# Patient Record
Sex: FEMALE | Race: BLACK OR AFRICAN AMERICAN | NOT HISPANIC OR LATINO | Employment: PART TIME | URBAN - METROPOLITAN AREA
[De-identification: names, ages, dates, MRNs, and addresses within clinical notes are randomized per-mention and may not be internally consistent; named-entity substitution may affect disease eponyms.]

---

## 2019-09-12 ENCOUNTER — APPOINTMENT (OUTPATIENT)
Dept: GENERAL RADIOLOGY | Facility: HOSPITAL | Age: 24
End: 2019-09-12

## 2019-09-12 ENCOUNTER — HOSPITAL ENCOUNTER (EMERGENCY)
Facility: HOSPITAL | Age: 24
Discharge: HOME | End: 2019-09-12
Attending: EMERGENCY MEDICINE | Admitting: EMERGENCY MEDICINE

## 2019-09-12 VITALS
DIASTOLIC BLOOD PRESSURE: 68 MMHG | RESPIRATION RATE: 12 BRPM | HEART RATE: 67 BPM | TEMPERATURE: 99.2 F | OXYGEN SATURATION: 98 % | WEIGHT: 120 LBS | SYSTOLIC BLOOD PRESSURE: 106 MMHG | HEIGHT: 61 IN | BODY MASS INDEX: 22.66 KG/M2

## 2019-09-12 DIAGNOSIS — R07.89 ATYPICAL CHEST PAIN: ICD-10-CM

## 2019-09-12 DIAGNOSIS — R00.2 PALPITATIONS: Primary | ICD-10-CM

## 2019-09-12 LAB
ALBUMIN SERPL-MCNC: 4.9 G/DL (ref 3.5–5.2)
ALBUMIN/GLOB SERPL: 1.6 G/DL
ALP SERPL-CCNC: 65 U/L (ref 39–117)
ALT SERPL W P-5'-P-CCNC: 18 U/L (ref 1–33)
ANION GAP SERPL CALCULATED.3IONS-SCNC: 13 MMOL/L (ref 5–15)
AST SERPL-CCNC: 14 U/L (ref 1–32)
BASOPHILS # BLD AUTO: 0.06 10*3/MM3 (ref 0–0.2)
BASOPHILS NFR BLD AUTO: 0.5 % (ref 0–1.5)
BILIRUB SERPL-MCNC: <0.2 MG/DL (ref 0.2–1.2)
BUN BLD-MCNC: 10 MG/DL (ref 6–20)
BUN/CREAT SERPL: 12.7 (ref 7–25)
CALCIUM SPEC-SCNC: 9.6 MG/DL (ref 8.6–10.5)
CHLORIDE SERPL-SCNC: 103 MMOL/L (ref 98–107)
CO2 SERPL-SCNC: 24 MMOL/L (ref 22–29)
CREAT BLD-MCNC: 0.79 MG/DL (ref 0.57–1)
D DIMER PPP FEU-MCNC: 0.45 MCGFEU/ML (ref 0–0.56)
DEPRECATED RDW RBC AUTO: 45.2 FL (ref 37–54)
EOSINOPHIL # BLD AUTO: 0.27 10*3/MM3 (ref 0–0.4)
EOSINOPHIL NFR BLD AUTO: 2.2 % (ref 0.3–6.2)
ERYTHROCYTE [DISTWIDTH] IN BLOOD BY AUTOMATED COUNT: 14 % (ref 12.3–15.4)
GFR SERPL CREATININE-BSD FRML MDRD: 91 ML/MIN/1.73
GLOBULIN UR ELPH-MCNC: 3 GM/DL
GLUCOSE BLD-MCNC: 84 MG/DL (ref 65–99)
HCT VFR BLD AUTO: 42 % (ref 34–46.6)
HGB BLD-MCNC: 13 G/DL (ref 12–15.9)
IMM GRANULOCYTES # BLD AUTO: 0.04 10*3/MM3 (ref 0–0.05)
IMM GRANULOCYTES NFR BLD AUTO: 0.3 % (ref 0–0.5)
LYMPHOCYTES # BLD AUTO: 2.69 10*3/MM3 (ref 0.7–3.1)
LYMPHOCYTES NFR BLD AUTO: 21.7 % (ref 19.6–45.3)
MCH RBC QN AUTO: 27.3 PG (ref 26.6–33)
MCHC RBC AUTO-ENTMCNC: 31 G/DL (ref 31.5–35.7)
MCV RBC AUTO: 88.2 FL (ref 79–97)
MONOCYTES # BLD AUTO: 0.75 10*3/MM3 (ref 0.1–0.9)
MONOCYTES NFR BLD AUTO: 6.1 % (ref 5–12)
NEUTROPHILS # BLD AUTO: 8.56 10*3/MM3 (ref 1.7–7)
NEUTROPHILS NFR BLD AUTO: 69.2 % (ref 42.7–76)
NRBC BLD AUTO-RTO: 0 /100 WBC (ref 0–0.2)
NT-PROBNP SERPL-MCNC: 27.5 PG/ML (ref 5–450)
PLATELET # BLD AUTO: 381 10*3/MM3 (ref 140–450)
PMV BLD AUTO: 10 FL (ref 6–12)
POTASSIUM BLD-SCNC: 3.7 MMOL/L (ref 3.5–5.2)
PROT SERPL-MCNC: 7.9 G/DL (ref 6–8.5)
RBC # BLD AUTO: 4.76 10*6/MM3 (ref 3.77–5.28)
SODIUM BLD-SCNC: 140 MMOL/L (ref 136–145)
TROPONIN T SERPL-MCNC: <0.01 NG/ML (ref 0–0.03)
TSH SERPL DL<=0.05 MIU/L-ACNC: 1.16 UIU/ML (ref 0.27–4.2)
WBC NRBC COR # BLD: 12.37 10*3/MM3 (ref 3.4–10.8)

## 2019-09-12 PROCEDURE — 84484 ASSAY OF TROPONIN QUANT: CPT | Performed by: PHYSICIAN ASSISTANT

## 2019-09-12 PROCEDURE — 93005 ELECTROCARDIOGRAM TRACING: CPT | Performed by: EMERGENCY MEDICINE

## 2019-09-12 PROCEDURE — 85025 COMPLETE CBC W/AUTO DIFF WBC: CPT | Performed by: PHYSICIAN ASSISTANT

## 2019-09-12 PROCEDURE — 84443 ASSAY THYROID STIM HORMONE: CPT | Performed by: PHYSICIAN ASSISTANT

## 2019-09-12 PROCEDURE — 80053 COMPREHEN METABOLIC PANEL: CPT | Performed by: PHYSICIAN ASSISTANT

## 2019-09-12 PROCEDURE — 71045 X-RAY EXAM CHEST 1 VIEW: CPT

## 2019-09-12 PROCEDURE — 83880 ASSAY OF NATRIURETIC PEPTIDE: CPT | Performed by: PHYSICIAN ASSISTANT

## 2019-09-12 PROCEDURE — 85379 FIBRIN DEGRADATION QUANT: CPT | Performed by: PHYSICIAN ASSISTANT

## 2019-09-12 PROCEDURE — 99284 EMERGENCY DEPT VISIT MOD MDM: CPT

## 2019-09-12 RX ORDER — SODIUM CHLORIDE 0.9 % (FLUSH) 0.9 %
10 SYRINGE (ML) INJECTION AS NEEDED
Status: DISCONTINUED | OUTPATIENT
Start: 2019-09-12 | End: 2019-09-12 | Stop reason: HOSPADM

## 2019-09-12 RX ORDER — ASPIRIN 325 MG
325 TABLET, DELAYED RELEASE (ENTERIC COATED) ORAL ONCE
Status: COMPLETED | OUTPATIENT
Start: 2019-09-12 | End: 2019-09-12

## 2019-09-12 RX ORDER — TRAZODONE HYDROCHLORIDE 50 MG/1
50 TABLET ORAL NIGHTLY
COMMUNITY
End: 2021-09-16 | Stop reason: SDUPTHER

## 2019-09-12 RX ADMIN — ASPIRIN 325 MG: 325 TABLET, COATED ORAL at 18:50

## 2019-09-12 NOTE — ED PROVIDER NOTES
"Carmelo Bravo is a 24 y.o. female with a history of atrial fibrillation who presents to the ED with complaints of intermittent palpitations since last night. She states that she has had 5 episodes of heart palpitations today, the last one being \"really bad\" and prompting her to come into the ED today. She also complains of shortness of breath, cough, tingling in her left upper extremity and fatigue, as well as, chest pain secondary to her palpitations. She describes the chest pain as sharp and relates it last about 3-5 seconds. She denies any abdominal pain, nausea, vomiting, or leg swelling. Of note, she has not been compliant with her medications. She did not take her bisoprolol for 3 months until she started having palpitations last night. She relates that she is on birth control and has a history of hypertension. She denies any prior history of DVT, PE, diabetes mellitus, or myocardial infarction. She denies any recent long travel or immobilization. Her cardiologist, who she sees for a fib and arrhythmia, is at [facility] and she advises that she has missed her last cardiologist appointment. Her last stress test was a while ago. She reports that she smokes about a pack every 2 days and she drinks alcohol. She denies any drug use. There are no other acute complaints at this time.        History provided by:  Patient  Palpitations   Palpitations quality:  Unable to specify  Onset quality:  Chronic  Timing:  Intermittent  Progression:  Unchanged  Chronicity:  Chronic  Relieved by:  None tried  Worsened by:  Nothing  Ineffective treatments:  None tried  Associated symptoms: chest pain, cough, malaise/fatigue and shortness of breath    Associated symptoms: no back pain, no nausea and no vomiting    Risk factors: hx of atrial fibrillation    Risk factors: no diabetes mellitus, no hx of DVT and no hx of PE        Review of Systems   Constitutional: Positive for malaise/fatigue. Negative for chills and " fever.   Respiratory: Positive for cough and shortness of breath.    Cardiovascular: Positive for chest pain and palpitations. Negative for leg swelling.   Gastrointestinal: Negative for abdominal pain, nausea and vomiting.   Genitourinary: Negative for dysuria.   Musculoskeletal: Negative for back pain.   Skin: Negative for pallor.   Allergic/Immunologic: Negative for immunocompromised state.   Neurological: Negative for light-headedness and headaches.        + tingling in left upper extremity   Hematological: Negative.    Psychiatric/Behavioral: Negative.    All other systems reviewed and are negative.      Past Medical History:   Diagnosis Date   • Atrial fibrillation (CMS/HCC)        No Known Allergies    Past Surgical History:   Procedure Laterality Date   • ADENOIDECTOMY     • CHOLECYSTECTOMY     • TONSILLECTOMY         History reviewed. No pertinent family history.    Social History     Socioeconomic History   • Marital status: Single     Spouse name: Not on file   • Number of children: Not on file   • Years of education: Not on file   • Highest education level: Not on file   Tobacco Use   • Smoking status: Light Tobacco Smoker     Types: Cigarettes   Substance and Sexual Activity   • Alcohol use: Yes     Comment: social   • Drug use: Yes     Types: Marijuana   • Sexual activity: Yes   Social History Narrative    Patient's grandmother is present at bedside         Objective   Physical Exam   Constitutional: She is oriented to person, place, and time. She appears well-developed and well-nourished. No distress.   No acute distress.   HENT:   Head: Normocephalic and atraumatic.   Nose: Nose normal.   Eyes: Conjunctivae are normal. No scleral icterus.   Neck: Normal range of motion. Neck supple.   Cardiovascular: Normal rate, regular rhythm and normal heart sounds.   No murmur heard.  Pulmonary/Chest: Effort normal and breath sounds normal. No respiratory distress. She exhibits no tenderness.   No reproducible  chest wall pain.   Abdominal: Soft. Bowel sounds are normal. She exhibits no distension. There is no tenderness.   Musculoskeletal: Normal range of motion. She exhibits no edema or tenderness.   No calf tenderness. No edema.    Neurological: She is alert and oriented to person, place, and time.   Skin: Skin is warm and dry.   Psychiatric: She has a normal mood and affect. Her behavior is normal.   Nursing note and vitals reviewed.      Procedures         ED Course      7:38 PM  Pt is resting comfortably.  No ectopy noted in ED.  Normal labs.  No pain while here.  Pt states she drinks a lot of caffeine (3 sodas per day) and has been taking over the counter decongestants for recent congestion.  I advised her to decrease stimulant use.  Will refer to arrhythmia clinic for follow up.  Recent Results (from the past 24 hour(s))   Comprehensive Metabolic Panel    Collection Time: 09/12/19  6:11 PM   Result Value Ref Range    Glucose 84 65 - 99 mg/dL    BUN 10 6 - 20 mg/dL    Creatinine 0.79 0.57 - 1.00 mg/dL    Sodium 140 136 - 145 mmol/L    Potassium 3.7 3.5 - 5.2 mmol/L    Chloride 103 98 - 107 mmol/L    CO2 24.0 22.0 - 29.0 mmol/L    Calcium 9.6 8.6 - 10.5 mg/dL    Total Protein 7.9 6.0 - 8.5 g/dL    Albumin 4.90 3.50 - 5.20 g/dL    ALT (SGPT) 18 1 - 33 U/L    AST (SGOT) 14 1 - 32 U/L    Alkaline Phosphatase 65 39 - 117 U/L    Total Bilirubin <0.2 (L) 0.2 - 1.2 mg/dL    eGFR Non African Amer 91 >60 mL/min/1.73    Globulin 3.0 gm/dL    A/G Ratio 1.6 g/dL    BUN/Creatinine Ratio 12.7 7.0 - 25.0    Anion Gap 13.0 5.0 - 15.0 mmol/L   D-dimer, Quantitative    Collection Time: 09/12/19  6:11 PM   Result Value Ref Range    D-Dimer, Quantitative 0.45 0.00 - 0.56 MCGFEU/mL   TSH    Collection Time: 09/12/19  6:11 PM   Result Value Ref Range    TSH 1.160 0.270 - 4.200 uIU/mL   CBC Auto Differential    Collection Time: 09/12/19  6:11 PM   Result Value Ref Range    WBC 12.37 (H) 3.40 - 10.80 10*3/mm3    RBC 4.76 3.77 - 5.28  10*6/mm3    Hemoglobin 13.0 12.0 - 15.9 g/dL    Hematocrit 42.0 34.0 - 46.6 %    MCV 88.2 79.0 - 97.0 fL    MCH 27.3 26.6 - 33.0 pg    MCHC 31.0 (L) 31.5 - 35.7 g/dL    RDW 14.0 12.3 - 15.4 %    RDW-SD 45.2 37.0 - 54.0 fl    MPV 10.0 6.0 - 12.0 fL    Platelets 381 140 - 450 10*3/mm3    Neutrophil % 69.2 42.7 - 76.0 %    Lymphocyte % 21.7 19.6 - 45.3 %    Monocyte % 6.1 5.0 - 12.0 %    Eosinophil % 2.2 0.3 - 6.2 %    Basophil % 0.5 0.0 - 1.5 %    Immature Grans % 0.3 0.0 - 0.5 %    Neutrophils, Absolute 8.56 (H) 1.70 - 7.00 10*3/mm3    Lymphocytes, Absolute 2.69 0.70 - 3.10 10*3/mm3    Monocytes, Absolute 0.75 0.10 - 0.90 10*3/mm3    Eosinophils, Absolute 0.27 0.00 - 0.40 10*3/mm3    Basophils, Absolute 0.06 0.00 - 0.20 10*3/mm3    Immature Grans, Absolute 0.04 0.00 - 0.05 10*3/mm3    nRBC 0.0 0.0 - 0.2 /100 WBC   Troponin    Collection Time: 09/12/19  6:11 PM   Result Value Ref Range    Troponin T <0.010 0.000 - 0.030 ng/mL   BNP    Collection Time: 09/12/19  6:11 PM   Result Value Ref Range    proBNP 27.5 5.0 - 450.0 pg/mL     Note: In addition to lab results from this visit, the labs listed above may include labs taken at another facility or during a different encounter within the last 24 hours. Please correlate lab times with ED admission and discharge times for further clarification of the services performed during this visit.    XR Chest 1 View   Preliminary Result   No acute cardiopulmonary process.       DICTATED:   09/12/2019   EDITED/ls :   09/12/2019                 Vitals:    09/12/19 1715 09/12/19 1734 09/12/19 1802 09/12/19 1833   BP: 111/61 107/57 101/74 107/71   Pulse: 74 73 74 66   Resp:       Temp:       SpO2: 99% 98% 100% 96%   Weight:       Height:         Medications   sodium chloride 0.9 % flush 10 mL (not administered)   aspirin EC tablet 325 mg (325 mg Oral Given 9/12/19 1850)     ECG/EMG Results (last 24 hours)     Procedure Component Value Units Date/Time    ECG 12 Lead [[id number]] Collected:   09/12/19 1713     Updated:  09/12/19 1717        ECG 12 Lead                               MDM    Final diagnoses:   Palpitations   Atypical chest pain       Documentation assistance provided by jocelynn Stewart.  Information recorded by the scribe was done at my direction and has been verified and validated by me.     Delmis Stewart  09/12/19 1735       Julio Chase, PA  09/12/19 [id number]

## 2019-09-12 NOTE — DISCHARGE INSTRUCTIONS
Rest.  Avoid caffeine and other stimulants such as decongestants.  Follow up with the arhythmia clinic (they should call for time to come in).  Return to ER if worse.

## 2019-09-13 ENCOUNTER — OFFICE VISIT (OUTPATIENT)
Dept: CARDIOLOGY | Facility: HOSPITAL | Age: 24
End: 2019-09-13

## 2019-09-13 ENCOUNTER — HOSPITAL ENCOUNTER (OUTPATIENT)
Dept: CARDIOLOGY | Facility: HOSPITAL | Age: 24
Discharge: HOME | End: 2019-09-13
Admitting: NURSE PRACTITIONER

## 2019-09-13 VITALS
TEMPERATURE: 98.2 F | HEART RATE: 77 BPM | OXYGEN SATURATION: 97 % | DIASTOLIC BLOOD PRESSURE: 61 MMHG | SYSTOLIC BLOOD PRESSURE: 107 MMHG

## 2019-09-13 DIAGNOSIS — R00.2 PALPITATIONS: ICD-10-CM

## 2019-09-13 DIAGNOSIS — R00.2 PALPITATIONS: Primary | ICD-10-CM

## 2019-09-13 DIAGNOSIS — I49.3 PVC'S (PREMATURE VENTRICULAR CONTRACTIONS): ICD-10-CM

## 2019-09-13 PROCEDURE — 0296T HC EXT ECG > 48HR TO 21 DAY RCRD W/CONECT INTL RCRD: CPT

## 2019-09-13 PROCEDURE — 99203 OFFICE O/P NEW LOW 30 MIN: CPT | Performed by: NURSE PRACTITIONER

## 2019-09-13 RX ORDER — DILTIAZEM HYDROCHLORIDE 60 MG/1
60 CAPSULE, EXTENDED RELEASE ORAL 2 TIMES DAILY
Qty: 60 CAPSULE | Refills: 3 | OUTPATIENT
Start: 2019-09-13 | End: 2021-02-26

## 2019-09-13 RX ORDER — BISOPROLOL FUMARATE 10 MG/1
10 TABLET, FILM COATED ORAL DAILY
COMMUNITY
End: 2019-09-13 | Stop reason: DRUGHIGH

## 2019-09-13 NOTE — PROGRESS NOTES
[facility] Heart Monitor Documentation    Silvia Bravo  1995  [id number]  09/13/19    ROSELINE Sahu    [] ZIO XT Patch  Model [id number] Prescribed for N/A Days    · Serial Number: (N + 9 Digits) N   · Apply-By Date on Box:   · USPS Tracking Number:   · USPS Tracking        [] [employer] BodyGuardian MINI PLUS Mobile Cardiac Telemetry  Model BGMINIPLUS Prescribed for N/A Days    · Serial Number: (BGM + 7 Digits) BGM  · Shipped-By Date on Box:   · UPS Tracking Number: [id number]  · UPS Tracking      [x] [employer] Body[employer]ian MINI Holter Monitor  Model BGMINIEL Prescribed for 14 Days    · Serial Number: (7 Digits) [id number]  · Shipped-By Date on Box: 09/12/2019   · UPS Tracking Number: [id number]  · UPS Tracking        This monitor was applied to the patient's chest and checked for proper functioning.  Ms. Silvia Bravo was instructed in the proper use of this monitor.  She was given the opportunity to ask questions and left the office with the device 's instruction manual.    Araceli aSlas MA, 1:40 PM, 09/13/19                  [facility] Center[facility] 8.8.2019

## 2019-09-13 NOTE — PROGRESS NOTES
[facility]      Encounter Date:09/13/2019     [facility]ravo  [address] [facility] [address]  [unfilled]    1995    Provider, No Known    Silvia Bravo is a 24 y.o. female.      Subjective:     Chief Complaint:  Palpitations and Establish Care       HPI patient presents to the office today for ongoing evaluation of her palpitations. She notes she has been followed by [facility] in the past. Has a hx of PVCs. Patient notes palpitations are occurring more frequently.  She has associated dyspnea as well as tingling in left upper extremity and fatigue.  She notes she does experience chest pain during her palpitations and she notes it is sharp in nature lasting 3 to 5 seconds.  She had been prescribed bisoprolol in the past but notes she just recently restarted it.  Prior to last night she had not been taking it on a regular basis.  She did have a stress and echo in 2017, data deficient.  She reports she did not follow-up with cardiology and her last office visit.  She smokes 1 pack of cigarettes every 2 days and alcohol socially.  She uses marijuana regularly      Patient Active Problem List   Diagnosis   • Palpitations       History reviewed. No pertinent past medical history.    Past Surgical History:   Procedure Laterality Date   • ADENOIDECTOMY     • CHOLECYSTECTOMY     • TONSILLECTOMY         Family History   Problem Relation Age of Onset   • Sleep apnea Father    • No Known Problems Sister    • No Known Problems Brother    • Atrial fibrillation Maternal Grandmother    • Skin cancer Maternal Grandmother    • Diabetes Maternal Grandfather    • Hypertension Maternal Grandfather    • Stroke Maternal Grandfather    • Heart attack Maternal Grandfather    • No Known Problems Paternal Grandmother    • No Known Problems Paternal Grandfather        Social History     Socioeconomic History   • Marital status: Single     Spouse name: Not on file   • Number of children:  Not on file   • Years of education: Not on file   • Highest education level: Not on file   Tobacco Use   • Smoking status: Light Tobacco Smoker     Types: Cigarettes   • Smokeless tobacco: Never Used   Substance and Sexual Activity   • Alcohol use: Yes     Comment: social   • Drug use: Yes     Types: Marijuana   • Sexual activity: Yes   Social History Narrative    Patient's grandmother is present at bedside    caffeine use. occassionally       No Known Allergies      Current Outpatient Medications:   •  traZODone (DESYREL) 50 MG tablet, Take 50 mg by mouth Every Night., Disp: , Rfl:   •  diltiaZEM SR (CARDIZEM SR) 60 MG 12 hr capsule, Take 1 capsule by mouth 2 (Two) Times a Day., Disp: 60 capsule, Rfl: 3    The following portions of the patient's history were reviewed today and updated as appropriate: allergies, current medications, past family history, past medical history, past social history, past surgical history and problem list     Review of Systems   Constitution: Positive for malaise/fatigue. Negative for chills, decreased appetite, diaphoresis, fever, weakness, night sweats, weight gain and weight loss.   HENT: Negative for congestion, hearing loss, hoarse voice and nosebleeds.    Eyes: Negative for blurred vision, visual disturbance and visual halos.   Cardiovascular: Positive for dyspnea on exertion, irregular heartbeat and palpitations. Negative for chest pain, claudication, cyanosis, leg swelling, near-syncope, orthopnea, paroxysmal nocturnal dyspnea and syncope.   Respiratory: Negative for cough, hemoptysis, shortness of breath, sleep disturbances due to breathing, snoring, sputum production and wheezing.    Hematologic/Lymphatic: Negative for bleeding problem. Does not bruise/bleed easily.   Skin: Negative for dry skin, itching and rash.   Musculoskeletal: Negative for arthritis, joint pain, joint swelling and myalgias.   Gastrointestinal: Negative for bloating, abdominal pain, constipation, diarrhea,  flatus, heartburn, hematemesis, hematochezia, melena, nausea and vomiting.   Genitourinary: Negative for dysuria, frequency, hematuria, nocturia and urgency.   Neurological: Negative for excessive daytime sleepiness, dizziness, headaches, light-headedness and loss of balance.   Psychiatric/Behavioral: Negative for depression. The patient does not have insomnia and is not nervous/anxious.        Objective:     Vitals:    09/13/19 1251 09/13/19 1253 09/13/19 1254   BP: 104/53 107/54 107/61   BP Location: Right arm Left arm Left arm   Patient Position: Sitting Sitting Standing   Cuff Size: Adult Adult Adult   Pulse: 75  77   Temp: 98.2 °F (36.8 °C)     TempSrc: Temporal     SpO2: 97%  97%       Physical Exam   Constitutional: She is oriented to person, place, and time. She appears well-developed and well-nourished. She is active and cooperative. No distress.   HENT:   Head: Normocephalic and atraumatic.   Mouth/Throat: Oropharynx is clear and moist.   Eyes: Conjunctivae and EOM are normal. Pupils are equal, round, and reactive to light.   Neck: Normal range of motion. Neck supple. No JVD present. No tracheal deviation present. No thyromegaly present.   Cardiovascular: Normal rate, regular rhythm, normal heart sounds and intact distal pulses.  Occasional extrasystoles are present.   Pulmonary/Chest: Effort normal and breath sounds normal.   Abdominal: Soft. Bowel sounds are normal. She exhibits no distension. There is no tenderness.   Musculoskeletal: Normal range of motion.   Neurological: She is alert and oriented to person, place, and time.   Skin: Skin is warm, dry and intact.   Psychiatric: She has a normal mood and affect. Her behavior is normal.   Nursing note and vitals reviewed.      Lab and Diagnostic Review:  Results for orders placed or performed during the hospital encounter of 09/12/19   Comprehensive Metabolic Panel   Result Value Ref Range    Glucose 84 65 - 99 mg/dL    BUN 10 6 - 20 mg/dL    Creatinine  0.79 0.57 - 1.00 mg/dL    Sodium 140 136 - 145 mmol/L    Potassium 3.7 3.5 - 5.2 mmol/L    Chloride 103 98 - 107 mmol/L    CO2 24.0 22.0 - 29.0 mmol/L    Calcium 9.6 8.6 - 10.5 mg/dL    Total Protein 7.9 6.0 - 8.5 g/dL    Albumin 4.90 3.50 - 5.20 g/dL    ALT (SGPT) 18 1 - 33 U/L    AST (SGOT) 14 1 - 32 U/L    Alkaline Phosphatase 65 39 - 117 U/L    Total Bilirubin <0.2 (L) 0.2 - 1.2 mg/dL    eGFR Non African Amer 91 >60 mL/min/1.73    Globulin 3.0 gm/dL    A/G Ratio 1.6 g/dL    BUN/Creatinine Ratio 12.7 7.0 - 25.0    Anion Gap 13.0 5.0 - 15.0 mmol/L   D-dimer, Quantitative   Result Value Ref Range    D-Dimer, Quantitative 0.45 0.00 - 0.56 MCGFEU/mL   TSH   Result Value Ref Range    TSH 1.160 0.270 - 4.200 uIU/mL   CBC Auto Differential   Result Value Ref Range    WBC 12.37 (H) 3.40 - 10.80 10*3/mm3    RBC 4.76 3.77 - 5.28 10*6/mm3    Hemoglobin 13.0 12.0 - 15.9 g/dL    Hematocrit 42.0 34.0 - 46.6 %    MCV 88.2 79.0 - 97.0 fL    MCH 27.3 26.6 - 33.0 pg    MCHC 31.0 (L) 31.5 - 35.7 g/dL    RDW 14.0 12.3 - 15.4 %    RDW-SD 45.2 37.0 - 54.0 fl    MPV 10.0 6.0 - 12.0 fL    Platelets 381 140 - 450 10*3/mm3    Neutrophil % 69.2 42.7 - 76.0 %    Lymphocyte % 21.7 19.6 - 45.3 %    Monocyte % 6.1 5.0 - 12.0 %    Eosinophil % 2.2 0.3 - 6.2 %    Basophil % 0.5 0.0 - 1.5 %    Immature Grans % 0.3 0.0 - 0.5 %    Neutrophils, Absolute 8.56 (H) 1.70 - 7.00 10*3/mm3    Lymphocytes, Absolute 2.69 0.70 - 3.10 10*3/mm3    Monocytes, Absolute 0.75 0.10 - 0.90 10*3/mm3    Eosinophils, Absolute 0.27 0.00 - 0.40 10*3/mm3    Basophils, Absolute 0.06 0.00 - 0.20 10*3/mm3    Immature Grans, Absolute 0.04 0.00 - 0.05 10*3/mm3    nRBC 0.0 0.0 - 0.2 /100 WBC   Troponin   Result Value Ref Range    Troponin T <0.010 0.000 - 0.030 ng/mL   BNP   Result Value Ref Range    proBNP 27.5 5.0 - 450.0 pg/mL     EKG: Normal sinus rhythm with sinus arrhythmia  T wave abnormality, consider anterior ischemia  Abnormal ECG  No previous ECGs available  Confirmed  by MD SOTO CORY ([id number]) on 9/16/2019 4:17:17 PM    Assessment and Plan:   1. Palpitations    - Holter Monitor - 72 Hour Up To 14 Days    2. PVC's (premature ventricular contractions)  14 day holter to assess burden and rule out arrthymias    Will request stress and echo from [facility] for review      F/4 weeks     It has been a pleasure to participate in the care of this patient.  Patient was instructed to call the [facility] with any questions, concerns, or worsening symptoms.    *Please note that portions of this note were completed with a voice recognition program. Efforts were made to edit the dictations, but occasionally words are mistranscribed.

## 2019-09-19 PROBLEM — R00.2 PALPITATIONS: Status: ACTIVE | Noted: 2019-09-19

## 2019-09-23 ENCOUNTER — DOCUMENTATION (OUTPATIENT)
Dept: CARDIOLOGY | Facility: HOSPITAL | Age: 24
End: 2019-09-23

## 2019-09-23 NOTE — PROGRESS NOTES
Records from [facility] cardiology, Dr. Gamez:  GXT 2017 within normal limits  Echo July 2017: No valvular disease noted EF 55%  24-hour Holter showed average heart rate of 91 bpm PVC burden 4.6% with no PACs.  No arrhythmias noted.

## 2019-09-27 PROCEDURE — 0298T HOLTER MONITOR - 72 HOUR UP TO 21 DAY: CPT | Performed by: INTERNAL MEDICINE

## 2019-10-14 ENCOUNTER — OFFICE VISIT (OUTPATIENT)
Dept: CARDIOLOGY | Facility: HOSPITAL | Age: 24
End: 2019-10-14

## 2019-10-14 VITALS
TEMPERATURE: 98.1 F | RESPIRATION RATE: 18 BRPM | SYSTOLIC BLOOD PRESSURE: 105 MMHG | WEIGHT: 129 LBS | OXYGEN SATURATION: 98 % | HEIGHT: 61 IN | DIASTOLIC BLOOD PRESSURE: 54 MMHG | HEART RATE: 79 BPM | BODY MASS INDEX: 24.35 KG/M2

## 2019-10-14 DIAGNOSIS — R00.2 PALPITATIONS: Primary | ICD-10-CM

## 2019-10-14 PROCEDURE — 99213 OFFICE O/P EST LOW 20 MIN: CPT | Performed by: NURSE PRACTITIONER

## 2019-10-14 RX ORDER — LORAZEPAM 0.5 MG/1
0.5 TABLET ORAL DAILY
COMMUNITY
Start: 2019-09-17 | End: 2021-02-26

## 2019-10-14 NOTE — PROGRESS NOTES
[facility]  [facility]      Encounter Date:10/14/2019     [facility]ravo  [address] [facility] [place] [address]  [unfilled]    1995    Provider, No Known    Silvia Bravo is a 24 y.o. female.      Subjective:     Chief Complaint:  Follow-up   palpitations     HPI patient presents the office today for ongoing evaluation of her palpitations.  She recently wore an extended Holter monitor and is here to follow-up on those results.  She reports palpitations are significantly better since starting the diltiazem.  She denies chest pain, dyspnea, presyncope, syncope, orthopnea, pedal edema.      Patient Active Problem List   Diagnosis   • Palpitations       History reviewed. No pertinent past medical history.    Past Surgical History:   Procedure Laterality Date   • ADENOIDECTOMY     • CHOLECYSTECTOMY     • TONSILLECTOMY         Family History   Problem Relation Age of Onset   • Sleep apnea Father    • No Known Problems Sister    • No Known Problems Brother    • Atrial fibrillation Maternal Grandmother    • Skin cancer Maternal Grandmother    • Diabetes Maternal Grandfather    • Hypertension Maternal Grandfather    • Stroke Maternal Grandfather    • Heart attack Maternal Grandfather    • No Known Problems Paternal Grandmother    • No Known Problems Paternal Grandfather        Social History     Socioeconomic History   • Marital status: Single     Spouse name: Not on file   • Number of children: Not on file   • Years of education: Not on file   • Highest education level: Not on file   Tobacco Use   • Smoking status: Light Tobacco Smoker     Types: Cigarettes   • Smokeless tobacco: Never Used   Substance and Sexual Activity   • Alcohol use: Yes     Comment: social   • Drug use: Yes     Types: Marijuana   • Sexual activity: Yes   Social History Narrative    Patient's grandmother is present at bedside       No Known Allergies      Current Outpatient Medications:   •  diltiaZEM SR (CARDIZEM SR) 60 MG 12  hr capsule, Take 1 capsule by mouth 2 (Two) Times a Day., Disp: 60 capsule, Rfl: 3  •  LORazepam (ATIVAN) 0.5 MG tablet, Take 0.5 mg by mouth Daily., Disp: , Rfl:   •  traZODone (DESYREL) 50 MG tablet, Take 50 mg by mouth Every Night., Disp: , Rfl:     The following portions of the patient's history were reviewed today and updated as appropriate: allergies, current medications, past family history, past medical history, past social history, past surgical history and problem list     Review of Systems   Constitution: Negative for chills, decreased appetite, diaphoresis, fever, weakness, malaise/fatigue, night sweats, weight gain and weight loss.   HENT: Negative for congestion, hearing loss, hoarse voice and nosebleeds.    Eyes: Negative for blurred vision, visual disturbance and visual halos.   Cardiovascular: Positive for palpitations. Negative for chest pain, claudication, cyanosis, dyspnea on exertion, irregular heartbeat, leg swelling, near-syncope, orthopnea, paroxysmal nocturnal dyspnea and syncope.   Respiratory: Negative for cough, hemoptysis, shortness of breath, sleep disturbances due to breathing, snoring, sputum production and wheezing.    Hematologic/Lymphatic: Negative for bleeding problem. Does not bruise/bleed easily.   Skin: Negative for dry skin, itching and rash.   Musculoskeletal: Negative for arthritis, falls, joint pain, joint swelling and myalgias.   Gastrointestinal: Negative for bloating, abdominal pain, constipation, diarrhea, flatus, heartburn, hematemesis, hematochezia, melena, nausea and vomiting.   Genitourinary: Negative for dysuria, frequency, hematuria, nocturia and urgency.   Neurological: Negative for excessive daytime sleepiness, dizziness, headaches, light-headedness and loss of balance.   Psychiatric/Behavioral: Negative for depression. The patient does not have insomnia and is not nervous/anxious.        Objective:     Vitals:    10/14/19 1306   BP: 105/54   BP Location: Right  "arm   Patient Position: Sitting   Cuff Size: Adult   Pulse: 79   Resp: 18   Temp: 98.1 °F (36.7 °C)   TempSrc: Temporal   SpO2: 98%   Weight: 58.5 kg (129 lb)   Height: 154.9 cm (61\")       Physical Exam   Constitutional: She is oriented to person, place, and time. She appears well-developed and well-nourished. She is active and cooperative. No distress.   HENT:   Head: Normocephalic and atraumatic.   Mouth/Throat: Oropharynx is clear and moist.   Eyes: Conjunctivae and EOM are normal. Pupils are equal, round, and reactive to light.   Neck: Normal range of motion. Neck supple. No JVD present. No tracheal deviation present. No thyromegaly present.   Cardiovascular: Normal rate, regular rhythm, normal heart sounds and intact distal pulses.   Pulmonary/Chest: Effort normal and breath sounds normal.   Abdominal: Soft. Bowel sounds are normal. She exhibits no distension. There is no tenderness.   Musculoskeletal: Normal range of motion.   Neurological: She is alert and oriented to person, place, and time.   Skin: Skin is warm, dry and intact.   Psychiatric: She has a normal mood and affect. Her behavior is normal.   Nursing note and vitals reviewed.      Lab and Diagnostic Review:  Results for orders placed or performed during the hospital encounter of 09/12/19   Comprehensive Metabolic Panel   Result Value Ref Range    Glucose 84 65 - 99 mg/dL    BUN 10 6 - 20 mg/dL    Creatinine 0.79 0.57 - 1.00 mg/dL    Sodium 140 136 - 145 mmol/L    Potassium 3.7 3.5 - 5.2 mmol/L    Chloride 103 98 - 107 mmol/L    CO2 24.0 22.0 - 29.0 mmol/L    Calcium 9.6 8.6 - 10.5 mg/dL    Total Protein 7.9 6.0 - 8.5 g/dL    Albumin 4.90 3.50 - 5.20 g/dL    ALT (SGPT) 18 1 - 33 U/L    AST (SGOT) 14 1 - 32 U/L    Alkaline Phosphatase 65 39 - 117 U/L    Total Bilirubin <0.2 (L) 0.2 - 1.2 mg/dL    eGFR Non African Amer 91 >60 mL/min/1.73    Globulin 3.0 gm/dL    A/G Ratio 1.6 g/dL    BUN/Creatinine Ratio 12.7 7.0 - 25.0    Anion Gap 13.0 5.0 - 15.0 " mmol/L   D-dimer, Quantitative   Result Value Ref Range    D-Dimer, Quantitative 0.45 0.00 - 0.56 MCGFEU/mL   TSH   Result Value Ref Range    TSH 1.160 0.270 - 4.200 uIU/mL   CBC Auto Differential   Result Value Ref Range    WBC 12.37 (H) 3.40 - 10.80 10*3/mm3    RBC 4.76 3.77 - 5.28 10*6/mm3    Hemoglobin 13.0 12.0 - 15.9 g/dL    Hematocrit 42.0 34.0 - 46.6 %    MCV 88.2 79.0 - 97.0 fL    MCH 27.3 26.6 - 33.0 pg    MCHC 31.0 (L) 31.5 - 35.7 g/dL    RDW 14.0 12.3 - 15.4 %    RDW-SD 45.2 37.0 - 54.0 fl    MPV 10.0 6.0 - 12.0 fL    Platelets 381 140 - 450 10*3/mm3    Neutrophil % 69.2 42.7 - 76.0 %    Lymphocyte % 21.7 19.6 - 45.3 %    Monocyte % 6.1 5.0 - 12.0 %    Eosinophil % 2.2 0.3 - 6.2 %    Basophil % 0.5 0.0 - 1.5 %    Immature Grans % 0.3 0.0 - 0.5 %    Neutrophils, Absolute 8.56 (H) 1.70 - 7.00 10*3/mm3    Lymphocytes, Absolute 2.69 0.70 - 3.10 10*3/mm3    Monocytes, Absolute 0.75 0.10 - 0.90 10*3/mm3    Eosinophils, Absolute 0.27 0.00 - 0.40 10*3/mm3    Basophils, Absolute 0.06 0.00 - 0.20 10*3/mm3    Immature Grans, Absolute 0.04 0.00 - 0.05 10*3/mm3    nRBC 0.0 0.0 - 0.2 /100 WBC   Troponin   Result Value Ref Range    Troponin T <0.010 0.000 - 0.030 ng/mL   BNP   Result Value Ref Range    proBNP 27.5 5.0 - 450.0 pg/mL     EK19: Normal sinus rhythm with sinus arrhythmia  T wave abnormality, consider anterior ischemia  Abnormal ECG  No previous ECGs available  Confirmed by MD BRITTANY, MARLON ([id number]) on 2019 4:17:17 PM  Extended Holter ordered for 14 days worn for 4 days and 6 hours.  Average heart rate 97 bpm with no arrhythmias noted.  PAC/PVC burden less than 1%.  GXT 2017 within normal limits, echo 2017 within normal limits ([facility]   Assessment and Plan:   1. Palpitations  Low burden on recent monitor  Continue diltiazem            It has been a pleasure to participate in the care of this patient.  Patient was instructed to call the [facility] with any questions, concerns,  or worsening symptoms.    *Please note that portions of this note were completed with a voice recognition program. Efforts were made to edit the dictations, but occasionally words are mistranscribed.

## 2020-10-20 ENCOUNTER — OFFICE VISIT (OUTPATIENT)
Dept: FAMILY MEDICINE CLINIC | Facility: CLINIC | Age: 25
End: 2020-10-20
Payer: COMMERCIAL

## 2020-10-20 VITALS
HEART RATE: 63 BPM | HEIGHT: 61 IN | TEMPERATURE: 98 F | SYSTOLIC BLOOD PRESSURE: 122 MMHG | OXYGEN SATURATION: 100 % | DIASTOLIC BLOOD PRESSURE: 68 MMHG | BODY MASS INDEX: 25.49 KG/M2 | WEIGHT: 135 LBS | RESPIRATION RATE: 16 BRPM

## 2020-10-20 DIAGNOSIS — Z20.822 SUSPECTED COVID-19 VIRUS INFECTION: ICD-10-CM

## 2020-10-20 DIAGNOSIS — J02.0 STREP PHARYNGITIS: Primary | ICD-10-CM

## 2020-10-20 DIAGNOSIS — J02.9 SORE THROAT: ICD-10-CM

## 2020-10-20 PROCEDURE — 99203 OFFICE O/P NEW LOW 30 MIN: CPT | Performed by: NURSE PRACTITIONER

## 2020-10-20 PROCEDURE — 3078F DIAST BP <80 MM HG: CPT | Performed by: NURSE PRACTITIONER

## 2020-10-20 PROCEDURE — 3008F BODY MASS INDEX DOCD: CPT | Performed by: NURSE PRACTITIONER

## 2020-10-20 PROCEDURE — 3074F SYST BP LT 130 MM HG: CPT | Performed by: NURSE PRACTITIONER

## 2020-10-20 PROCEDURE — 87880 STREP A ASSAY W/OPTIC: CPT | Performed by: NURSE PRACTITIONER

## 2020-10-20 PROCEDURE — U0003 INFECTIOUS AGENT DETECTION BY NUCLEIC ACID (DNA OR RNA); SEVERE ACUTE RESPIRATORY SYNDROME CORONAVIRUS 2 (SARS-COV-2) (CORONAVIRUS DISEASE [COVID-19]), AMPLIFIED PROBE TECHNIQUE, MAKING USE OF HIGH THROUGHPUT TECHNOLOGIES AS DESCRIBED BY CMS-2020-01-R: HCPCS | Performed by: NURSE PRACTITIONER

## 2020-10-20 RX ORDER — AMOXICILLIN AND CLAVULANATE POTASSIUM 500; 125 MG/1; MG/1
1 TABLET, FILM COATED ORAL 2 TIMES DAILY
Qty: 20 TABLET | Refills: 0 | Status: SHIPPED | OUTPATIENT
Start: 2020-10-20 | End: 2020-10-30

## 2020-10-20 NOTE — PROGRESS NOTES
CHIEF COMPLAINT:   Patient presents with:  Sore Throat: right swollen gland/congestion x 1 day no fever/cough      HPI:   Asuncion JORDAN Hussein Chapman is a 23year old female presents to clinic with complaint of sore throat. Patient has had since yesterday.   Patient d NOSE: nostrils patent, no nasal discharge, nasal mucosa wnl  THROAT: oral mucosa pink, moist. Posterior pharynx erythematous and injected. No exudates. Tonsils 0/4. Breath is not malodorous. No trismus. Uvula midline with no swelling.  Voice clear/normal. 2. Augmentin as prescribed. 3. Tylenol/Ibuprofen for pain/fevers. Salt water gargles three times daily  4. Use humidifier at home when possible. 5. The rapid strep test was positive today. 6. Covid-19 testing sent to lab.   Self quarantine at this time · You may use acetaminophen or ibuprofen to control pain or fever, unless another medicine was prescribed for this.  Talk with your healthcare provider before taking these medicines if you have chronic liver or kidney disease or if you have had a stomach ul © 9758-1803 [employer] [address]. [address] [facility], [address] [place]. All rights reserved. This information is not intended as a substitute for professional medical care. Always follow your healthcare professional's instructions.       Coronavir 1. Stay home from work, school, and away from other public places. If you must go out, avoid using any kind of public transportation, ridesharing, or taxis. 2. Monitor your symptoms carefully.  If your symptoms get worse, call your healthcare provider imm If you have tested positive for COVID-19, you should remain under home isolation precautions following the below guidelines:  ? At least 24 hours have passed since recovery defined as resolution of fever without the use of fever-reducing medications; and If you would be interested in donating your plasma to help treat others diagnosed with the virus, please contact [employer] directly on their website: [email or web address]    Who is eligible to donate convalescent plasma?

## 2020-10-20 NOTE — PATIENT INSTRUCTIONS
1. Rest. Drink plenty of fluids. 2. Augmentin as prescribed. 3. Tylenol/Ibuprofen for pain/fevers. Salt water gargles three times daily  4. Use humidifier at home when possible. 5. The rapid strep test was positive today.   6. Covid-19 testing sent to l with your healthcare provider before taking these medicines if you have chronic liver or kidney disease or if you have had a stomach ulcer or gastrointestinal bleeding. · Throat lozenges or sprays help reduce pain.  Gargling with warm saltwater will also r Health is committed to the safety and well-being of our patients, members, employees, and communities.  As concerns arise about the new strain of coronavirus that causes COVID-19, Harpreet Greco is here to provide community members reliable answers of time and tell them that you have or may have COVID-19.  5. For medical emergencies, call 911 and notify the dispatch personnel that you have or may have COVID-19.   6. Cover your cough and sneezes.    7. Wash your hands often with soap and water for at l asymptomatic patient or date of symptom onset is unclear then use 10 days post COVID test date. · At least 20 days have passed for severe illness (requiring hospitalization) OR if you are immunocompromised.   If you have a fever with cough or shortness of eligible to donate. If you’re instructed by Antonietta Wilder that a repeat test is required, please contact the [facility] COVID-19 Nurse Triage Line at [phone].     Additional Information      You can also get more information at the following websites:

## 2021-09-12 PROCEDURE — 99223 1ST HOSP IP/OBS HIGH 75: CPT | Performed by: HOSPITALIST

## 2021-09-12 PROCEDURE — 93010 ELECTROCARDIOGRAM REPORT: CPT | Performed by: INTERNAL MEDICINE

## 2021-09-16 ENCOUNTER — OFFICE VISIT (OUTPATIENT)
Dept: FAMILY MEDICINE CLINIC | Facility: CLINIC | Age: 26
End: 2021-09-16

## 2021-09-16 VITALS
BODY MASS INDEX: 26.81 KG/M2 | WEIGHT: 142 LBS | DIASTOLIC BLOOD PRESSURE: 72 MMHG | HEART RATE: 91 BPM | SYSTOLIC BLOOD PRESSURE: 110 MMHG | HEIGHT: 61 IN | OXYGEN SATURATION: 98 % | TEMPERATURE: 96.9 F

## 2021-09-16 DIAGNOSIS — J30.2 SEASONAL ALLERGIES: ICD-10-CM

## 2021-09-16 DIAGNOSIS — F31.12 BIPOLAR 1 DISORDER WITH MODERATE MANIA (HCC): ICD-10-CM

## 2021-09-16 DIAGNOSIS — F41.1 GENERALIZED ANXIETY DISORDER: ICD-10-CM

## 2021-09-16 DIAGNOSIS — F51.05 INSOMNIA DUE TO OTHER MENTAL DISORDER: ICD-10-CM

## 2021-09-16 DIAGNOSIS — F90.2 ATTENTION DEFICIT HYPERACTIVITY DISORDER (ADHD), COMBINED TYPE: Primary | ICD-10-CM

## 2021-09-16 DIAGNOSIS — T78.40XD ALLERGIC REACTION, SUBSEQUENT ENCOUNTER: ICD-10-CM

## 2021-09-16 DIAGNOSIS — R63.2 EXCESSIVE HUNGER: ICD-10-CM

## 2021-09-16 DIAGNOSIS — F99 INSOMNIA DUE TO OTHER MENTAL DISORDER: ICD-10-CM

## 2021-09-16 DIAGNOSIS — Z11.59 ENCOUNTER FOR HEPATITIS C SCREENING TEST FOR LOW RISK PATIENT: ICD-10-CM

## 2021-09-16 DIAGNOSIS — R53.83 FATIGUE, UNSPECIFIED TYPE: ICD-10-CM

## 2021-09-16 PROBLEM — I49.3 VENTRICULAR PREMATURE BEATS: Status: ACTIVE | Noted: 2018-08-29

## 2021-09-16 PROCEDURE — 99215 OFFICE O/P EST HI 40 MIN: CPT | Performed by: NURSE PRACTITIONER

## 2021-09-16 RX ORDER — OXCARBAZEPINE 150 MG/1
TABLET, FILM COATED ORAL
COMMUNITY
Start: 2021-07-09 | End: 2021-09-16

## 2021-09-16 RX ORDER — CETIRIZINE HYDROCHLORIDE 10 MG/1
10 TABLET ORAL DAILY
Qty: 30 TABLET | Refills: 2 | Status: SHIPPED | OUTPATIENT
Start: 2021-09-16 | End: 2021-12-07

## 2021-09-16 RX ORDER — TRAZODONE HYDROCHLORIDE 50 MG/1
50 TABLET ORAL NIGHTLY
Qty: 30 TABLET | Refills: 2 | Status: SHIPPED | OUTPATIENT
Start: 2021-09-16 | End: 2021-12-07

## 2021-09-16 RX ORDER — EPINEPHRINE 0.3 MG/.3ML
0.3 INJECTION SUBCUTANEOUS ONCE
Qty: 1 EACH | Refills: 0 | Status: SHIPPED | OUTPATIENT
Start: 2021-09-16 | End: 2021-09-16

## 2021-09-16 RX ORDER — ARIPIPRAZOLE 5 MG/1
TABLET ORAL
COMMUNITY
Start: 2021-07-23 | End: 2021-09-16

## 2021-09-16 NOTE — PATIENT INSTRUCTIONS
How to Use an Auto-Injector Pen  An auto-injector pen (pre-filled automatic epinephrine injection device) is a device that is used to deliver epinephrine to the body. Epinephrine is a medicine that is given as a shot (injection). It works by relaxing the muscles in the airways and tightening the blood vessels. It is used to treat:  · A life-threatening allergic reaction (anaphylaxis).  · Serious breathing problems, such as severe asthma attacks, some lung problems, and other emergency conditions.  An epinephrine injection can save your life. You should always carry an auto-injector pen with you if you are at risk for severe asthma attacks or anaphylaxis. You may hear other names for an auto-injector pen. They are epinephrine injection, epinephrine auto-injector pen, epinephrine pen, and automatic injection device.  What are the risks?  Using the auto-injector pen is safe. However, problems may arise, including:  · Damage to bone or tissue. Make sure that you correctly place the needle in the muscle of your outer thigh as told by your health care provider.  When should I use my auto-injector pen?  Use your auto-injector pen as soon as you think you are experiencing anaphylaxis or a severe asthma attack. Anaphylaxis is very dangerous if it is not treated right away.  Signs and symptoms of anaphylaxis may include:  · Feeling warm in the face (flushed). This may include redness.  · Itchy, red, swollen areas of skin (hives).  · Swelling of the eyes, lips, face, mouth, tongue, or throat.  · Difficulty breathing, speaking, or swallowing.  · Noisy breathing (wheezing).  · Dizziness or light-headedness.  · Fainting.  · Pain or cramping in the abdomen.  · Vomiting.  · Diarrhea.  These symptoms may represent a serious problem that is an emergency. Do not wait to see if the symptoms will go away. Use your auto-injector pen as you have been instructed, and get medical help right away. Call your local emergency services (537 in  the [place] Do not drive yourself to the hospital.  General tips for using an auto-injector pen    · Use epinephrine exactly as told by your health care provider. Do not inject it more often or in greater or smaller doses than your health care provider told you. Most auto-injector pens contain one dose of epinephrine. Some contain two doses.  · Use the auto-injector pen to give yourself an injection under your skin or into your muscle on the outer side of your thigh. Do not give yourself an injection into your buttocks or any other part of your body.  ? In an emergency, you can use your auto-injector pen through your clothing.  ? After you inject a dose of epinephrine, some liquid may remain in your auto-injector pen. This is normal.  · If you need to give yourself a second dose of epinephrine, give the second injection in another area on your outer thigh. Do not give two injections in exactly the same place on your body. This can lead to tissue damage.  · From time to time:  ? Check the expiration date on your auto-injector pen.  ? Check the solution to ensure that it is not cloudy and that there are no particles floating in it. If your auto-injector pen is  or if the solution is cloudy or has particles floating in it, throw it away and get a new one.  · Ask your health care provider how to safely get rid of used or  auto-injector pens.  · Talk with your pharmacist or health care provider if you have questions about how to inject epinephrine correctly.  Get help right away if:  · You inject epinephrine. You may need additional medical care, and you may need to be monitored for the side effects of epinephrine. The side effects include:  ? Difficulty breathing.  ? Fast or irregular heartbeat.  ? Nausea or vomiting.  ? Sweating.  ? Dizziness.  ? Nervousness or anxiety.  ? Weakness.  ? Pale skin.  ? Headache.  ? Shaking that does not stop.  Summary  · An auto-injector pen (pre-filled automatic epinephrine  injection device) is a device that is used to deliver epinephrine to the body.  · An auto-injector pen is used to treat a life-threatening allergic reaction (anaphylaxis), asthma attack, or other emergency conditions.  · You should always carry an auto-injector pen with you if you are at risk for anaphylaxis or severe asthma attacks.  · Use of this device is safe. However, bone or tissue damage can occur if you do not follow instructions for injecting the medicine.  · Talk with your pharmacist or health care provider if you have questions about how to inject epinephrine correctly.  This information is not intended to replace advice given to you by your health care provider. Make sure you discuss any questions you have with your health care provider.  Document Revised: 01/29/2020 Document Reviewed: 01/29/2020  [employer] Patient Education © 2021 [employer]    Insomnia  Insomnia is a sleep disorder that makes it difficult to fall asleep or stay asleep. Insomnia can cause fatigue, low energy, difficulty concentrating, mood swings, and poor performance at work or school.  There are three different ways to classify insomnia:  · Difficulty falling asleep.  · Difficulty staying asleep.  · Waking up too early in the morning.  Any type of insomnia can be long-term (chronic) or short-term (acute). Both are common. Short-term insomnia usually lasts for three months or less. Chronic insomnia occurs at least three times a week for longer than three months.  What are the causes?  Insomnia may be caused by another condition, situation, or substance, such as:  · Anxiety.  · Certain medicines.  · Gastroesophageal reflux disease (GERD) or other gastrointestinal conditions.  · Asthma or other breathing conditions.  · Restless legs syndrome, sleep apnea, or other sleep disorders.  · Chronic pain.  · Menopause.  · Stroke.  · Abuse of alcohol, tobacco, or illegal drugs.  · Mental health conditions, such as  depression.  · Caffeine.  · Neurological disorders, such as Alzheimer's disease.  · An overactive thyroid (hyperthyroidism).  Sometimes, the cause of insomnia may not be known.  What increases the risk?  Risk factors for insomnia include:  · Gender. Women are affected more often than men.  · Age. Insomnia is more common as you get older.  · Stress.  · Lack of exercise.  · Irregular work schedule or working night shifts.  · Traveling between different time zones.  · Certain medical and mental health conditions.  What are the signs or symptoms?  If you have insomnia, the main symptom is having trouble falling asleep or having trouble staying asleep. This may lead to other symptoms, such as:  · Feeling fatigued or having low energy.  · Feeling nervous about going to sleep.  · Not feeling rested in the morning.  · Having trouble concentrating.  · Feeling irritable, anxious, or depressed.  How is this diagnosed?  This condition may be diagnosed based on:  · Your symptoms and medical history. Your health care provider may ask about:  ? Your sleep habits.  ? Any medical conditions you have.  ? Your mental health.  · A physical exam.  How is this treated?  Treatment for insomnia depends on the cause. Treatment may focus on treating an underlying condition that is causing insomnia. Treatment may also include:  · Medicines to help you sleep.  · Counseling or therapy.  · Lifestyle adjustments to help you sleep better.  Follow these instructions at home:  Eating and drinking    · Limit or avoid alcohol, caffeinated beverages, and cigarettes, especially close to bedtime. These can disrupt your sleep.  · Do not eat a large meal or eat spicy foods right before bedtime. This can lead to digestive discomfort that can make it hard for you to sleep.    Sleep habits    · Keep a sleep diary to help you and your health care provider figure out what could be causing your insomnia. Write down:  ? When you sleep.  ? When you wake up during  the night.  ? How well you sleep.  ? How rested you feel the next day.  ? Any side effects of medicines you are taking.  ? What you eat and drink.  · Make your bedroom a dark, comfortable place where it is easy to fall asleep.  ? Put up shades or blackout curtains to block light from outside.  ? Use a white noise machine to block noise.  ? Keep the temperature cool.  · Limit screen use before bedtime. This includes:  ? Watching TV.  ? Using your smartphone, tablet, or computer.  · Stick to a routine that includes going to bed and waking up at the same times every day and night. This can help you fall asleep faster. Consider making a quiet activity, such as reading, part of your nighttime routine.  · Try to avoid taking naps during the day so that you sleep better at night.  · Get out of bed if you are still awake after 15 minutes of trying to sleep. Keep the lights down, but try reading or doing a quiet activity. When you feel sleepy, go back to bed.    General instructions  · Take over-the-counter and prescription medicines only as told by your health care provider.  · Exercise regularly, as told by your health care provider. Avoid exercise starting several hours before bedtime.  · Use relaxation techniques to manage stress. Ask your health care provider to suggest some techniques that may work well for you. These may include:  ? Breathing exercises.  ? Routines to release muscle tension.  ? Visualizing peaceful scenes.  · Make sure that you drive carefully. Avoid driving if you feel very sleepy.  · Keep all follow-up visits as told by your health care provider. This is important.  Contact a health care provider if:  · You are tired throughout the day.  · You have trouble in your daily routine due to sleepiness.  · You continue to have sleep problems, or your sleep problems get worse.  Get help right away if:  · You have serious thoughts about hurting yourself or someone else.  If you ever feel like you may hurt  yourself or others, or have thoughts about taking your own life, get help right away. You can go to your nearest emergency department or call:  · Your local emergency services (911 in the [place]  · A suicide crisis helpline, such as the National Suicide Prevention Lifeline at 1-904.203.7359. This is open 24 hours a day.  Summary  · Insomnia is a sleep disorder that makes it difficult to fall asleep or stay asleep.  · Insomnia can be long-term (chronic) or short-term (acute).  · Treatment for insomnia depends on the cause. Treatment may focus on treating an underlying condition that is causing insomnia.  · Keep a sleep diary to help you and your health care provider figure out what could be causing your insomnia.  This information is not intended to replace advice given to you by your health care provider. Make sure you discuss any questions you have with your health care provider.  Document Revised: 11/30/2018 Document Reviewed: 09/27/2018  [employer] Patient Education © 2021 [employer]    Mckenzie  Mckenzie is a condition that affects people who have certain mood disorders. Mckenzie involves episodes of emotional highs that include having very high energy, racing thoughts, very high self-esteem, and decreased ability to concentrate. These episodes are very intense and can last longer than a week. In some cases, episodes of mckenzie can be so strong that people with this condition need to be hospitalized for their safety and the safety of people around them.  What are the causes?  The cause of this condition is not known.  What increases the risk?  You are more likely to develop mckenzie if you have a mood disorder, especially bipolar disorder.  If you have a mood disorder, the following factors may increase your risk of developing mckenzie:  · Not getting enough sleep.  · Using substances such as tobacco, caffeine, or illegal drugs.  · Certain prescription medicines, such as antidepressants or antibiotics.  · Stress or emotional  events.  · Certain seasons. Mckenzie is more common in spring and summer.  · The period of time after having a baby (postpartum period).  What are the signs or symptoms?  Symptoms of this condition include:  · Periods of having very high energy that may last longer than a week. In some cases, you have so much energy that you may become unsafe and need to go to the hospital.  · Very high self-esteem or self-confidence.  · Decreased need for sleep.  · Being unusually talkative, or feeling a need to keep talking. Speech may be very fast. It may seem like you cannot stop talking.  · Racing thoughts or constant talking, with quick shifts between topics that may or may not be related (flight of ideas).  · Decreased ability to focus or concentrate.  · Increased purposeful activity, such as work, study, or social activity.  · Increased nonproductive activity. This could be pacing, squirming and fidgeting, or finger and toe tapping.  · Impulsive behavior and poor judgment. This may result in high-risk activities, such as having unprotected sex or spending a lot of money.  · Having false beliefs (delusions) or seeing, hearing, or feeling things that do not exist (hallucinations).  How is this diagnosed?  This condition may be diagnosed based on:  · Your symptoms and medical history.  · A physical exam. Your health care provider will check for physical conditions that may be causing your symptoms.  · A mental health evaluation. You may be referred to a mental health provider who specializes in diagnosing and treating mood disorders.  How is this treated?  This condition may be treated with:  · Medicines, such as mood stabilizers.  · Talk therapy (psychotherapy) with a mental health provider.  · A procedure to change the brain chemicals that send messages between brain cells (neurotransmitters). This procedure, called electroconvulsive therapy (ECT), applies short electrical pulses to the brain through the scalp. This may be used  in cases of severe mckenzie when other treatments have not helped.  Follow these instructions at home:  · Take over-the-counter and prescription medicines only as told by your health care provider.  · Try to go to sleep and wake up at the same time every day.  · Make and follow a routine for daily meal times.  · Ask for support from family, friends, or relatives to make sure you stay on track with your treatment.  · Keep all follow-up visits as told by your health care provider. This is important.  Contact a health care provider if:  · You have concerns about your treatment.  · You have side effects from your prescription medicines.  · Your symptoms do not improve or they get worse.  · Your mckenzie may be putting your health, or others' health, at risk.  Get help right away if:  · You think about hurting yourself or you try to hurt yourself.  · You think about suicide.  If you ever feel like you may hurt yourself or others, or have thoughts about taking your own life, get help right away. You can go to your nearest emergency department or call:  · Your local emergency services (911 in the [place]  · A suicide crisis helpline, such as the National Suicide Prevention Lifeline at 1-654.173.9185. This is open 24 hours a day.  Summary  · Mckenzie involves episodes of emotional highs that include having very high energy, racing thoughts, very high self-esteem, and decreased ability to concentrate.  · Episodes of mckenzie are very intense and can last longer than a week.  · Treatment for mckenzie may include medicines and talk therapy (psychotherapy).  This information is not intended to replace advice given to you by your health care provider. Make sure you discuss any questions you have with your health care provider.  Document Revised: 06/02/2021 Document Reviewed: 06/02/2021  [employer] Patient Education © 2021 [employer]

## 2021-09-16 NOTE — PROGRESS NOTES
Silvia Bravo presents today to establish care.    Establish Care, Allergic Reaction (Pt states she had an allergic reaction and went to [facility] in [facility] and had to be intubated and went to ICU 9/12/21), and ADHD       HPI   Pt presents today to establish care.  She has a PMH of season allergies, Bipolar, JOYCELYN, insomnia, and ADHD.    She was diagnosed with Bipolar in 2018 at [facility].  She felt like she had issues before, but wasn't diagnosed.  She has more issues with marcy.  She hasn't recently had any manic episodes.  She doesn't sleep.  No one can tell her what to do.  She will do whatever she wants to do.  She does spend a lot of money on food.  She tends to overeat.      She was diagnosed with ADHD when she was 15 or 16.  She didn't like the way the medication made her felt.  She stopped taking the medication at 22 or 23.  She works at [employer] and has issues with getting things she needs to get done at work or in her life.  She has had 20 jobs and she is only 26.      She had an allergic reaction.  She sprayed a bug spray.  She went into a river with her son.  1 hour after that her throat got itchy and sore.  She woke up in the middle of the night and her throat was closed off.  She went to the ED and her b/p was elevated.  They told her that she had an infection in her tonsils d/t an allergic reaction.  They told her to see an allergist.  They put her on oxygen and then they released her.  They put her on Amoxicillin and steroids.  She hasn't picked up the steroids yet.  They also gave her nausea medication.    She had a kidney infection about 3 weeks ago.  When she was a teenager, she had both an infection and a stone.      She gets really hungry in the middle of the night.  If she doesn't eat, then she feels like she doesn't have the energy to do anything.       Past Medical History:   Diagnosis Date   • Bipolar disorder (CMS/[facility])    • Depression    • GERD (gastroesophageal reflux  "disease)    • Kidney infection    • Kidney stones         Past Surgical History:   Procedure Laterality Date   • ADENOIDECTOMY     • CHOLECYSTECTOMY     • TONSILLECTOMY          Family History   Problem Relation Age of Onset   • Sleep apnea Father    • No Known Problems Sister    • No Known Problems Brother    • Atrial fibrillation Maternal Grandmother    • Skin cancer Maternal Grandmother    • Diabetes Maternal Grandfather    • Hypertension Maternal Grandfather    • Stroke Maternal Grandfather    • Heart attack Maternal Grandfather    • No Known Problems Paternal Grandmother    • No Known Problems Paternal Grandfather         Social History     Socioeconomic History   • Marital status: Single     Spouse name: Not on file   • Number of children: Not on file   • Years of education: Not on file   • Highest education level: Not on file   Tobacco Use   • Smoking status: Light Tobacco Smoker     Types: Cigarettes   • Smokeless tobacco: Never Used   Substance and Sexual Activity   • Alcohol use: Yes     Comment: social   • Drug use: Yes     Types: Marijuana   • Sexual activity: Yes        Current Outpatient Medications on File Prior to Visit   Medication Sig Dispense Refill   • [DISCONTINUED] traZODone (DESYREL) 50 MG tablet Take 50 mg by mouth Every Night.     • [DISCONTINUED] ARIPiprazole (ABILIFY) 5 MG tablet      • [DISCONTINUED] OXcarbazepine (TRILEPTAL) 150 MG tablet        No current facility-administered medications on file prior to visit.       Allergies   Allergen Reactions   • Latex Rash        Vitals:    09/16/21 1036   BP: 110/72   Pulse: 91   Temp: 96.9 °F (36.1 °C)   SpO2: 98%   Weight: 64.4 kg (142 lb)   Height: 154.9 cm (61\")   PainSc: 0-No pain        Physical Exam  Vitals reviewed.   Constitutional:       Appearance: Normal appearance.   HENT:      Head: Normocephalic and atraumatic.      Right Ear: Ear canal and external ear normal. Tympanic membrane is bulging.      Left Ear: Ear canal and external " ear normal. Tympanic membrane is bulging.      Nose:      Right Turbinates: Swollen.      Left Turbinates: Swollen.      Right Sinus: No maxillary sinus tenderness or frontal sinus tenderness.      Left Sinus: No maxillary sinus tenderness or frontal sinus tenderness.      Mouth/Throat:      Lips: Pink.      Mouth: Mucous membranes are moist.      Tongue: Tongue does not deviate from midline.      Palate: No lesions.      Pharynx: Oropharynx is clear. Uvula midline.   Neck:      Comments: Bilateral tonsillar lymphadenopathy  Cardiovascular:      Rate and Rhythm: Normal rate and regular rhythm.      Heart sounds: Normal heart sounds.   Pulmonary:      Effort: Pulmonary effort is normal.      Breath sounds: Normal breath sounds.   Skin:     General: Skin is warm and dry.   Neurological:      General: No focal deficit present.      Mental Status: She is alert and oriented to person, place, and time.   Psychiatric:         Mood and Affect: Mood normal.         Behavior: Behavior normal.         Thought Content: Thought content normal.         Judgment: Judgment normal.        Diagnoses and all orders for this visit:    1. Attention deficit hyperactivity disorder (ADHD), combined type (Primary) -pt has a hard time paying attention and finishing things she needs to do.  She has had over 20 jobs d/t this.  She took medication up until she was 22 or 23.  She does feel like she needs to restart this.    --Referral to Behavioral health  -     Ambulatory Referral to Behavioral Health    2. Insomnia due to other mental disorder -long term issue.  She states that it does well with taking Trazodone.  She needs a refill on this.  --RF Trazodone 50mg daily.  -     traZODone (DESYREL) 50 MG tablet; Take 1 tablet by mouth Every Night.  Dispense: 30 tablet; Refill: 2    3. Generalized anxiety disorder -she has been on multiple medications, but they have not been effective.  --Referral to Behavioral Health.  -     Ambulatory Referral  to Behavioral Health    4. Bipolar 1 disorder with moderate marcy (CMS/[facility]) -diagnosed in 2018.  She has manic behavior and makes poor decisions regardless of consequences.  She has taken medication for this, but it has not been effective.  She would like to get better control of this.  --Referral to Behavioral Health.  -     Ambulatory Referral to Behavioral Health    5. Allergic reaction, subsequent encounter -pt does not know what caused this.  She has never had this happen before.  She was in the hospital for this, but states that it was only for a few hours.  They put her on oxygen.  She denies being intubated.  She was given steroids and antibiotics.  She was discharged with these also, but has not been consistent with taking them.  --Discussed with patient the importance of taking her medications as directed.  Patient verbalized understanding.  --Referral to allergist.  --Will order an EpiPen.  Discussed with patient when to use an EpiPen and how to use it.  Discussed the importance of not using it if not having an allergic reaction due to adverse effects.  Patient verbalized understanding.  --Attempted to send TAMMY to hospital patient said she was seen in after she had her allergic reaction, but they have no history of the patient being there.  -     Ambulatory Referral to Allergy  -     EPINEPHrine (EpiPen 2-Lyle) 0.3 MG/0.3ML solution auto-injector injection; Inject 0.3 mL into the appropriate muscle as directed by prescriber 1 (One) Time for 1 dose.  Dispense: 1 each; Refill: 0    6. Fatigue, unspecified type -she has been feeling fatigued.  She is concerned about having diabetes.  Will check labs.  -     CBC & Differential; Future  -     Comprehensive Metabolic Panel; Future  -     TSH Rfx On Abnormal To Free T4; Future  -     Vitamin D 25 Hydroxy; Future  -     Vitamin B12; Future    7. Encounter for hepatitis C screening test for low risk patient  -     Hepatitis C Antibody; Future    8. Excessive hunger  -her father is a diabetic and her mother is a prediabetic.  She would like to be tested for diabetes due to her excessive hunger.  She states that when she wakes up at night if she does not eat she feels weak.  -     Hemoglobin A1c; Future    9. Seasonal allergies -she has issues with seasonal allergies, but has not been taking medication for this.  --Start cetirizine 10 mg daily.  -     cetirizine (zyrTEC) 10 MG tablet; Take 1 tablet by mouth Daily.  Dispense: 30 tablet; Refill: 2    45 minutes spent reviewing chart, interviewing pt, assessing pt, place orders, and documenting.    Return if symptoms worsen or fail to improve.    Agnieszka Lu, APRN

## 2021-10-18 ENCOUNTER — TELEPHONE (OUTPATIENT)
Dept: FAMILY MEDICINE CLINIC | Facility: CLINIC | Age: 26
End: 2021-10-18

## 2021-12-03 RX ORDER — FLUCONAZOLE 150 MG/1
TABLET ORAL
COMMUNITY
Start: 2021-11-18 | End: 2021-12-06

## 2021-12-03 RX ORDER — CLINDAMYCIN HYDROCHLORIDE 300 MG/1
CAPSULE ORAL
COMMUNITY
Start: 2021-10-19 | End: 2021-12-06

## 2021-12-03 RX ORDER — METRONIDAZOLE 500 MG/1
TABLET ORAL
COMMUNITY
Start: 2021-11-14 | End: 2021-12-06

## 2021-12-06 ENCOUNTER — OFFICE VISIT (OUTPATIENT)
Dept: FAMILY MEDICINE CLINIC | Facility: CLINIC | Age: 26
End: 2021-12-06

## 2021-12-06 VITALS
HEIGHT: 61 IN | HEART RATE: 78 BPM | OXYGEN SATURATION: 98 % | DIASTOLIC BLOOD PRESSURE: 78 MMHG | SYSTOLIC BLOOD PRESSURE: 112 MMHG | BODY MASS INDEX: 27.75 KG/M2 | WEIGHT: 147 LBS

## 2021-12-06 DIAGNOSIS — F31.12 BIPOLAR 1 DISORDER WITH MODERATE MANIA (HCC): Primary | ICD-10-CM

## 2021-12-06 DIAGNOSIS — K59.1 FUNCTIONAL DIARRHEA: ICD-10-CM

## 2021-12-06 PROCEDURE — 99214 OFFICE O/P EST MOD 30 MIN: CPT | Performed by: INTERNAL MEDICINE

## 2021-12-06 RX ORDER — MONTELUKAST SODIUM 4 MG/1
1 TABLET, CHEWABLE ORAL 2 TIMES DAILY
Qty: 60 TABLET | Refills: 6 | Status: SHIPPED | OUTPATIENT
Start: 2021-12-06 | End: 2022-05-05

## 2021-12-06 RX ORDER — ARIPIPRAZOLE 10 MG/1
10 TABLET ORAL DAILY
Qty: 30 TABLET | Refills: 9 | Status: SHIPPED | OUTPATIENT
Start: 2021-12-06 | End: 2022-03-10

## 2021-12-06 RX ORDER — BUSPIRONE HYDROCHLORIDE 10 MG/1
10 TABLET ORAL 3 TIMES DAILY
Qty: 90 TABLET | Refills: 9 | Status: SHIPPED | OUTPATIENT
Start: 2021-12-06 | End: 2022-02-02 | Stop reason: DRUGHIGH

## 2021-12-06 NOTE — PROGRESS NOTES
Silvia Bravo  1995  [id number]  Patient Care Team:  Mason Duarte MD as PCP - General (Internal Medicine)    Silvia Bravo is a 26 y.o. female here today for follow up.     This patient is accompanied by their self who contributes to the history of their care.    Chief Complaint:    Chief Complaint   Patient presents with   • Establish Care   • Manic Behavior     Has been off medication for 5 months    • Depression     Has been off medication for a year   • ADHD   • Anxiety         History of Present Illness:  I have reviewed and/or updated the patient's past medical, past surgical, family, social history, problem list and allergies as appropriate.     Has underlying bipolar, off medications x 1 year. Mainly manic episodes, had some depression last week. Only showered once in past week. Resides with grandmother. Has difficulty with ADHD. Last meds that worked with her was abilify, ativan. Had been on vyvanse in 2019. Denies suicidal thoughts. Does not recall. Not sleeping well, retires qt 4 am awakening at 2 pm. Trazodone has helped ( she has this at home).    He additionally has fecal urgency and diarrhea since her cholecystectomy.  No blood in her stool no abdominal pain fevers or chills or weight loss.    Review of Systems   Constitutional: Positive for fatigue. Negative for chills, fever, unexpected weight gain and unexpected weight loss.   HENT: Negative for ear pain, postnasal drip, sinus pressure and sore throat.    Eyes: Negative for blurred vision, double vision and visual disturbance.   Respiratory: Negative for cough, shortness of breath and wheezing.    Cardiovascular: Negative for chest pain, palpitations and leg swelling.   Gastrointestinal: Positive for diarrhea. Negative for abdominal pain, blood in stool, nausea and vomiting.   Endocrine: Negative for cold intolerance, heat intolerance, polydipsia and polyuria.   Genitourinary: Negative for dysuria, flank pain and hematuria.  "  Musculoskeletal: Negative for arthralgias and joint swelling.   Skin: Negative for dry skin and rash.   Neurological: Negative.  Negative for weakness, numbness and headache.   Psychiatric/Behavioral: Positive for decreased concentration, positive for hyperactivity and depressed mood. Negative for self-injury and suicidal ideas. The patient is nervous/anxious.        Vitals:    12/06/21 1056   BP: 112/78   Pulse: 78   SpO2: 98%   Weight: 66.7 kg (147 lb)   Height: 154.9 cm (60.98\")   PainSc: 0-No pain     Body mass index is 27.79 kg/m².    Physical Exam  Vitals and nursing note reviewed.   Constitutional:       General: She is not in acute distress.     Appearance: She is well-developed. She is not diaphoretic.   HENT:      Head: Normocephalic and atraumatic.      Right Ear: External ear normal.      Left Ear: External ear normal.      Mouth/Throat:      Pharynx: No oropharyngeal exudate.   Eyes:      General: No scleral icterus.        Right eye: No discharge.      Conjunctiva/sclera: Conjunctivae normal.   Neck:      Thyroid: No thyromegaly.      Vascular: No JVD.      Trachea: No tracheal deviation.   Cardiovascular:      Rate and Rhythm: Normal rate and regular rhythm.      Heart sounds: Normal heart sounds.      Comments: PMI nondisplaced  Pulmonary:      Effort: Pulmonary effort is normal.      Breath sounds: Normal breath sounds. No wheezing or rales.   Abdominal:      General: Bowel sounds are normal.      Palpations: Abdomen is soft.      Tenderness: There is no abdominal tenderness. There is no guarding or rebound.   Musculoskeletal:      Cervical back: Normal range of motion and neck supple.      Comments: Normal gait   Lymphadenopathy:      Cervical: No cervical adenopathy.   Skin:     General: Skin is warm and dry.      Capillary Refill: Capillary refill takes less than 2 seconds.      Coloration: Skin is not pale.      Findings: No rash.   Neurological:      Mental Status: She is alert and oriented " to person, place, and time.      Motor: No abnormal muscle tone.      Coordination: Coordination normal.   Psychiatric:         Attention and Perception: She is inattentive.         Mood and Affect: Mood is anxious.         Speech: Speech is rapid and pressured.         Behavior: Behavior is not agitated, slowed, aggressive, withdrawn, hyperactive or combative.         Thought Content: Thought content normal.         Cognition and Memory: Cognition normal.         Judgment: Judgment normal.         Procedures    Results Review:    None    Assessment/Plan:  This is back she has post cholecystectomy cystectomy bile acid hypersecretion induced diarrhea.  I placed her on colestipol 1 g p.o. twice daily.  For her bipolar disorder I placed her on Abilify 10 mg daily and BuSpar 10 mg p.o. 3 times daily.  She will will see all of her [place] psychiatry this Friday as we are successful in her appointment.  Problem List Items Addressed This Visit        Gastrointestinal Abdominal     Functional diarrhea      Other Visit Diagnoses     Bipolar 1 disorder with moderate marcy (HCC)    -  Primary    Relevant Medications    busPIRone (BUSPAR) 10 MG tablet    ARIPiprazole (Abilify) 10 MG tablet    Other Relevant Orders    Ambulatory Referral to Psychiatry    Ambulatory Referral to Psychology          Plan of care reviewed with patient at the conclusion of today's visit. Education was provided regarding diagnosis and management.  Patient verbalizes understanding of and agreement with management plan.    Return in about 6 weeks (around 1/17/2022).    Mason Duarte MD      Please note than portions of this note were completed wt a Voice Recognition Program

## 2021-12-29 NOTE — TELEPHONE ENCOUNTER
Patient has been contacted about behavioral health referrals. Patient has been given information on three different behavioral health clinics to contact to get scheduled. Patient must contact the clinic to schedule.

## 2022-02-01 RX ORDER — PREDNISONE 20 MG/1
TABLET ORAL
COMMUNITY
Start: 2021-12-12 | End: 2022-03-10

## 2022-02-01 RX ORDER — EPINEPHRINE 0.3 MG/.3ML
INJECTION SUBCUTANEOUS
COMMUNITY
Start: 2021-12-12 | End: 2022-05-05

## 2022-02-02 ENCOUNTER — LAB (OUTPATIENT)
Dept: LAB | Facility: HOSPITAL | Age: 27
End: 2022-02-02

## 2022-02-02 ENCOUNTER — OFFICE VISIT (OUTPATIENT)
Dept: FAMILY MEDICINE CLINIC | Facility: CLINIC | Age: 27
End: 2022-02-02

## 2022-02-02 VITALS
DIASTOLIC BLOOD PRESSURE: 70 MMHG | SYSTOLIC BLOOD PRESSURE: 104 MMHG | RESPIRATION RATE: 16 BRPM | HEIGHT: 61 IN | WEIGHT: 146.8 LBS | HEART RATE: 76 BPM | OXYGEN SATURATION: 98 % | BODY MASS INDEX: 27.72 KG/M2

## 2022-02-02 DIAGNOSIS — Z13.29 THYROID DISORDER SCREENING: Primary | ICD-10-CM

## 2022-02-02 DIAGNOSIS — Z13.29 THYROID DISORDER SCREENING: ICD-10-CM

## 2022-02-02 LAB — TSH SERPL DL<=0.05 MIU/L-ACNC: 0.77 UIU/ML (ref 0.27–4.2)

## 2022-02-02 PROCEDURE — 84443 ASSAY THYROID STIM HORMONE: CPT

## 2022-02-02 PROCEDURE — 36415 COLL VENOUS BLD VENIPUNCTURE: CPT

## 2022-02-02 PROCEDURE — 99213 OFFICE O/P EST LOW 20 MIN: CPT | Performed by: INTERNAL MEDICINE

## 2022-02-02 RX ORDER — BUSPIRONE HYDROCHLORIDE 7.5 MG/1
1 TABLET ORAL 2 TIMES DAILY
COMMUNITY
Start: 2022-01-21 | End: 2022-03-10

## 2022-02-02 RX ORDER — OLANZAPINE 5 MG/1
1 TABLET, ORALLY DISINTEGRATING ORAL DAILY
COMMUNITY
Start: 2022-01-24 | End: 2022-03-10

## 2022-02-02 RX ORDER — METHOCARBAMOL 750 MG/1
TABLET, FILM COATED ORAL TAKE AS DIRECTED
COMMUNITY
Start: 2022-01-29 | End: 2022-03-10
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
## 2022-02-10 ENCOUNTER — HOSPITAL ENCOUNTER (OUTPATIENT)
Dept: ULTRASOUND IMAGING | Facility: HOSPITAL | Age: 27
Discharge: HOME | End: 2022-02-10
Admitting: INTERNAL MEDICINE

## 2022-02-10 PROCEDURE — 76536 US EXAM OF HEAD AND NECK: CPT

## 2022-02-14 ENCOUNTER — TELEPHONE (OUTPATIENT)
Dept: FAMILY MEDICINE CLINIC | Facility: CLINIC | Age: 27
End: 2022-02-14

## 2022-02-14 NOTE — TELEPHONE ENCOUNTER
Attempted to contact patient to inform of result notes as ordered by provider and as listed below. No answer; Left voicemail for patient to return call with office number given.       ----- Message from Mason Duarte MD sent at 2/13/2022  8:15 AM EST -----  Regarding: FW:   Please notify that she does have  ysts on her thyroid which appear, by ultrasound, benign. They need NO further work up. This is good news   ----- Message -----  From: Meet, Rad Results [facility]  Sent: 2/10/2022   3:18 PM EST  To: Mason Duarte MD

## 2022-02-14 NOTE — TELEPHONE ENCOUNTER
Reported results to patient, she is requesting to see a surgeon to have cysts removed. She states that she is concerned even if they are benign and would like to have more options to manage this

## 2022-02-16 DIAGNOSIS — E04.2 MULTIPLE THYROID NODULES: Primary | ICD-10-CM

## 2022-02-16 NOTE — TELEPHONE ENCOUNTER
I do not think surgery is wise for this. I will recommend a second opinion from endocrine if she would consider. Please aDvise

## 2022-02-16 NOTE — TELEPHONE ENCOUNTER
Patient notified, she agreed to consult with endo. She is not currently established with an endocrinologist and requires a referral

## 2022-03-01 ENCOUNTER — APPOINTMENT (OUTPATIENT)
Dept: ULTRASOUND IMAGING | Facility: HOSPITAL | Age: 27
End: 2022-03-01

## 2022-03-10 ENCOUNTER — OFFICE VISIT (OUTPATIENT)
Dept: FAMILY MEDICINE CLINIC | Facility: CLINIC | Age: 27
End: 2022-03-10

## 2022-03-10 VITALS
BODY MASS INDEX: 27.3 KG/M2 | WEIGHT: 144.6 LBS | SYSTOLIC BLOOD PRESSURE: 112 MMHG | DIASTOLIC BLOOD PRESSURE: 74 MMHG | OXYGEN SATURATION: 98 % | HEIGHT: 61 IN | HEART RATE: 86 BPM

## 2022-03-10 DIAGNOSIS — M25.461 EFFUSION OF RIGHT PREPATELLAR BURSA: Primary | ICD-10-CM

## 2022-03-10 PROCEDURE — 99213 OFFICE O/P EST LOW 20 MIN: CPT | Performed by: INTERNAL MEDICINE

## 2022-03-10 RX ORDER — MELOXICAM 15 MG/1
15 TABLET ORAL DAILY
Qty: 30 TABLET | Refills: 0 | Status: SHIPPED | OUTPATIENT
Start: 2022-03-10 | End: 2022-05-05
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
## 2022-05-05 ENCOUNTER — OFFICE VISIT (OUTPATIENT)
Dept: FAMILY MEDICINE CLINIC | Facility: CLINIC | Age: 27
End: 2022-05-05

## 2022-05-05 VITALS
TEMPERATURE: 99.1 F | DIASTOLIC BLOOD PRESSURE: 78 MMHG | HEART RATE: 67 BPM | WEIGHT: 133.2 LBS | SYSTOLIC BLOOD PRESSURE: 118 MMHG | BODY MASS INDEX: 25.15 KG/M2 | OXYGEN SATURATION: 97 % | HEIGHT: 61 IN

## 2022-05-05 DIAGNOSIS — J01.00 ACUTE MAXILLARY SINUSITIS, RECURRENCE NOT SPECIFIED: ICD-10-CM

## 2022-05-05 DIAGNOSIS — R50.9 FEVER, UNSPECIFIED FEVER CAUSE: Primary | ICD-10-CM

## 2022-05-05 DIAGNOSIS — N91.2 AMENORRHEA: ICD-10-CM

## 2022-05-05 DIAGNOSIS — Z72.51 UNPROTECTED SEXUAL INTERCOURSE: ICD-10-CM

## 2022-05-05 DIAGNOSIS — R05.9 COUGH: ICD-10-CM

## 2022-05-05 DIAGNOSIS — J01.00 ACUTE NON-RECURRENT MAXILLARY SINUSITIS: ICD-10-CM

## 2022-05-05 PROBLEM — J32.9 SINUSITIS: Status: ACTIVE | Noted: 2022-05-05

## 2022-05-05 LAB
B-HCG UR QL: NEGATIVE
EXPIRATION DATE: NORMAL
EXPIRATION DATE: NORMAL
FLUAV AG UPPER RESP QL IA.RAPID: NOT DETECTED
FLUBV AG UPPER RESP QL IA.RAPID: NOT DETECTED
INTERNAL CONTROL: NORMAL
INTERNAL NEGATIVE CONTROL: NORMAL
INTERNAL POSITIVE CONTROL: NORMAL
Lab: NORMAL
Lab: NORMAL
SARS-COV-2 AG UPPER RESP QL IA.RAPID: NOT DETECTED

## 2022-05-05 PROCEDURE — 99214 OFFICE O/P EST MOD 30 MIN: CPT | Performed by: INTERNAL MEDICINE

## 2022-05-05 PROCEDURE — 81025 URINE PREGNANCY TEST: CPT | Performed by: INTERNAL MEDICINE

## 2022-05-05 PROCEDURE — 87428 SARSCOV & INF VIR A&B AG IA: CPT | Performed by: INTERNAL MEDICINE

## 2022-05-05 RX ORDER — AMOXICILLIN 500 MG/1
1000 CAPSULE ORAL 2 TIMES DAILY
Qty: 40 CAPSULE | Refills: 0 | OUTPATIENT
Start: 2022-05-05 | End: 2022-09-28

## 2022-05-05 RX ORDER — GUAIFENESIN, PSEUDOEPHEDRINE HYDROCHLORIDE 600; 60 MG/1; MG/1
1 TABLET, EXTENDED RELEASE ORAL EVERY 12 HOURS
Qty: 28 TABLET | Refills: 0 | OUTPATIENT
Start: 2022-05-05 | End: 2022-09-28

## 2022-05-05 RX ORDER — TRAZODONE HYDROCHLORIDE 100 MG/1
100 TABLET ORAL NIGHTLY
COMMUNITY
End: 2022-09-28

## 2022-05-05 RX ORDER — METHYLPREDNISOLONE 4 MG/1
TABLET ORAL
Qty: 1 EACH | Refills: 0 | OUTPATIENT
Start: 2022-05-05 | End: 2022-09-28

## 2022-05-05 NOTE — PROGRESS NOTES
"Silvia Bravo  1995  [id number]  Patient Care Team:  Mason Duarte MD as PCP - General (Internal Medicine)    Silvia Bravo is a 27 y.o. female here today for follow up.     This patient is accompanied by their self who contributes to the history of their care.    Chief Complaint:    Chief Complaint   Patient presents with   • Loss Of Taste   • Loss Of Smell   • Earache   • Fever         History of Present Illness:  I have reviewed and/or updated the patient's past medical, past surgical, family, social history, problem list and allergies as appropriate.      She is here complaining sinus pressure, watery eyes, There is sinus and ear pressure. There has been worsening mucopurulent nasal discharge. She has an unusal odor from her sinuses. Reports subjective fevers. There is mild FORREST with clogged nasal passages. No OTC meds taken.Denies wheezing.  Symptoms started allergies about a month.     Fearful she may be pregnant. Has tested 2x at home, both positive. LMP was 4/19. Technically she has not missed a cycle, just tested 2/2 to frequent unprotected intercourse with her partner. There is fatigue. Denies nausea or vomiting, spotting or crampiing    Review of Systems   Constitutional: Positive for fatigue and fever.   HENT: Positive for ear pain, postnasal drip and sinus pressure.    Eyes: Positive for pain.   Respiratory: Negative for cough, shortness of breath and wheezing.    Gastrointestinal: Negative for nausea and vomiting.   Endocrine: Negative for cold intolerance and polydipsia.   Genitourinary: Negative for amenorrhea, vaginal bleeding and vaginal discharge.   Allergic/Immunologic: Positive for environmental allergies.   Psychiatric/Behavioral: Positive for depressed mood. The patient is nervous/anxious.        Vitals:    05/05/22 1509   BP: 118/78   Pulse: 67   Temp: 99.1 °F (37.3 °C)   SpO2: 97%   Weight: 60.4 kg (133 lb 3.2 oz)   Height: 154.9 cm (60.98\")     Body mass index is 25.18 " kg/m².    Physical Exam  Vitals and nursing note reviewed.   Constitutional:       General: She is not in acute distress.     Appearance: She is well-developed. She is not diaphoretic.   HENT:      Head: Normocephalic and atraumatic.      Right Ear: External ear normal.      Left Ear: External ear normal.      Ears:      Comments: Dull tms     Mouth/Throat:      Mouth: Mucous membranes are moist.      Pharynx: No oropharyngeal exudate.      Comments: Turbid pnd, max sinuses ttp  Eyes:      General: No scleral icterus.        Right eye: No discharge.      Conjunctiva/sclera: Conjunctivae normal.   Neck:      Thyroid: No thyromegaly.      Vascular: No JVD.      Trachea: No tracheal deviation.   Cardiovascular:      Rate and Rhythm: Normal rate and regular rhythm.      Heart sounds: Normal heart sounds.      Comments: PMI nondisplaced  Pulmonary:      Effort: Pulmonary effort is normal.      Breath sounds: Normal breath sounds. No wheezing or rales.   Abdominal:      General: Bowel sounds are normal.      Palpations: Abdomen is soft.      Tenderness: There is no abdominal tenderness. There is no guarding or rebound.   Musculoskeletal:      Cervical back: Normal range of motion and neck supple.      Comments: Normal gait   Lymphadenopathy:      Cervical: No cervical adenopathy.   Skin:     General: Skin is warm and dry.      Capillary Refill: Capillary refill takes less than 2 seconds.      Coloration: Skin is not pale.      Findings: No rash.   Neurological:      Mental Status: She is alert and oriented to person, place, and time.      Motor: No abnormal muscle tone.      Coordination: Coordination normal.   Psychiatric:         Judgment: Judgment normal.         Procedures    Results Review:    I reviewed the patient's new clinical results.    Assessment/Plan:     Problem List Items Addressed This Visit        ENT    Sinusitis      Other Visit Diagnoses     Fever, unspecified fever cause    -  Primary    Relevant  Orders    POCT SARS-CoV-2 Antigen SEVERIANO (Completed)    Cough        Relevant Orders    POCT SARS-CoV-2 Antigen SEVERIANO (Completed)    Amenorrhea        Relevant Orders    POC Pregnancy, Urine (Completed)    Unprotected sexual intercourse        upt negative. councelled on benefits of barrier methods,       Recommend [place] pot, amoxicillin and Mucinex.  Recommend returning to her OB/GYN for family-planning.  Recommend use of barrier protection to prevent pregnancy and STDs.    Plan of care reviewed with patient at the conclusion of today's visit. Education was provided regarding diagnosis and management.  Patient verbalizes understanding of and agreement with management plan.    No follow-ups on file.    Mason Duarte MD      Please note than portions of this note were completed wt a Voice Recognition Program

## 2022-05-12 ENCOUNTER — OFFICE VISIT (OUTPATIENT)
Dept: ENDOCRINOLOGY | Facility: CLINIC | Age: 27
End: 2022-05-12

## 2022-05-12 ENCOUNTER — LAB (OUTPATIENT)
Dept: LAB | Facility: HOSPITAL | Age: 27
End: 2022-05-12

## 2022-05-12 VITALS
WEIGHT: 135 LBS | SYSTOLIC BLOOD PRESSURE: 108 MMHG | DIASTOLIC BLOOD PRESSURE: 70 MMHG | OXYGEN SATURATION: 98 % | BODY MASS INDEX: 25.49 KG/M2 | HEART RATE: 79 BPM | HEIGHT: 61 IN

## 2022-05-12 DIAGNOSIS — E04.2 MULTIPLE THYROID NODULES: Primary | ICD-10-CM

## 2022-05-12 DIAGNOSIS — E04.2 MULTIPLE THYROID NODULES: ICD-10-CM

## 2022-05-12 LAB — TSH SERPL DL<=0.05 MIU/L-ACNC: 0.95 UIU/ML (ref 0.27–4.2)

## 2022-05-12 PROCEDURE — 99203 OFFICE O/P NEW LOW 30 MIN: CPT | Performed by: INTERNAL MEDICINE

## 2022-05-12 PROCEDURE — 84443 ASSAY THYROID STIM HORMONE: CPT
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
## 2022-05-12 NOTE — ASSESSMENT & PLAN NOTE
She has been euthyroid.  She has tiny thyroid cysts.  These are too small to cause symptoms.  We discussed observation.  Plan for another neck u/s in about a year.  Check TSH today.  Will send note about results.

## 2023-08-27 ENCOUNTER — HOSPITAL ENCOUNTER (EMERGENCY)
Facility: HOSPITAL | Age: 28
Discharge: HOME | End: 2023-08-27
Attending: EMERGENCY MEDICINE | Admitting: EMERGENCY MEDICINE
Payer: COMMERCIAL

## 2023-08-27 VITALS
RESPIRATION RATE: 18 BRPM | WEIGHT: 130 LBS | TEMPERATURE: 100.6 F | OXYGEN SATURATION: 97 % | BODY MASS INDEX: 24.55 KG/M2 | HEART RATE: 84 BPM | SYSTOLIC BLOOD PRESSURE: 102 MMHG | HEIGHT: 61 IN | DIASTOLIC BLOOD PRESSURE: 59 MMHG

## 2023-08-27 DIAGNOSIS — J02.9 ACUTE VIRAL PHARYNGITIS: Primary | ICD-10-CM

## 2023-08-27 LAB
BACTERIA UR QL AUTO: ABNORMAL /HPF
BILIRUB UR QL STRIP: NEGATIVE
CLARITY UR: CLEAR
COLOR UR: YELLOW
FLUAV SUBTYP SPEC NAA+PROBE: NOT DETECTED
FLUBV RNA ISLT QL NAA+PROBE: NOT DETECTED
GLUCOSE UR STRIP-MCNC: NEGATIVE MG/DL
HCG INTACT+B SERPL-ACNC: <0.1 MIU/ML
HETEROPH AB SER QL LA: NEGATIVE
HGB UR QL STRIP.AUTO: ABNORMAL
HYALINE CASTS UR QL AUTO: ABNORMAL /LPF
KETONES UR QL STRIP: NEGATIVE
LEUKOCYTE ESTERASE UR QL STRIP.AUTO: NEGATIVE
NITRITE UR QL STRIP: NEGATIVE
PH UR STRIP.AUTO: 7 [PH] (ref 5–8)
PROT UR QL STRIP: ABNORMAL
RBC # UR STRIP: ABNORMAL /HPF
REF LAB TEST METHOD: ABNORMAL
S PYO AG THROAT QL: NEGATIVE
SARS-COV-2 RNA RESP QL NAA+PROBE: NOT DETECTED
SP GR UR STRIP: <=1.005 (ref 1–1.03)
SQUAMOUS #/AREA URNS HPF: ABNORMAL /HPF
UROBILINOGEN UR QL STRIP: ABNORMAL
WBC # UR STRIP: ABNORMAL /HPF

## 2023-08-27 PROCEDURE — 87880 STREP A ASSAY W/OPTIC: CPT

## 2023-08-27 PROCEDURE — 99283 EMERGENCY DEPT VISIT LOW MDM: CPT

## 2023-08-27 PROCEDURE — 96374 THER/PROPH/DIAG INJ IV PUSH: CPT

## 2023-08-27 PROCEDURE — 87081 CULTURE SCREEN ONLY: CPT

## 2023-08-27 PROCEDURE — 84702 CHORIONIC GONADOTROPIN TEST: CPT

## 2023-08-27 PROCEDURE — 96361 HYDRATE IV INFUSION ADD-ON: CPT

## 2023-08-27 PROCEDURE — 86308 HETEROPHILE ANTIBODY SCREEN: CPT

## 2023-08-27 PROCEDURE — 81001 URINALYSIS AUTO W/SCOPE: CPT

## 2023-08-27 PROCEDURE — 87636 SARSCOV2 & INF A&B AMP PRB: CPT

## 2023-08-27 PROCEDURE — 25010000002 DEXAMETHASONE SODIUM PHOSPHATE 100 MG/10ML SOLUTION

## 2023-08-27 PROCEDURE — 87147 CULTURE TYPE IMMUNOLOGIC: CPT

## 2023-08-27 RX ORDER — ACETAMINOPHEN 500 MG
1000 TABLET ORAL ONCE
Status: COMPLETED | OUTPATIENT
Start: 2023-08-27 | End: 2023-08-27

## 2023-08-27 RX ADMIN — ACETAMINOPHEN 1000 MG: 500 TABLET ORAL at 13:47

## 2023-08-27 RX ADMIN — DEXAMETHASONE SODIUM PHOSPHATE 10 MG: 10 INJECTION, SOLUTION INTRAMUSCULAR; INTRAVENOUS at 13:50

## 2023-08-27 RX ADMIN — SODIUM CHLORIDE, POTASSIUM CHLORIDE, SODIUM LACTATE AND CALCIUM CHLORIDE 1000 ML: 600; 310; 30; 20 INJECTION, SOLUTION INTRAVENOUS at 13:50

## 2023-08-27 NOTE — ED PROVIDER NOTES
"Subjective   History of Present Illness  Silvia Bravo is a 28-year-old female with a history of ADHD, anxiety, bipolar disorder, GERD, recurrent cystitis who presents to the ER for evaluation of a sore throat.  Patient states last night she had a sore throat that she describes as a mild discomfort and woke up this morning with severe throat pain.  Patient states at that time it felt like her throat was \" closing in on me.\"  Patient was seen at a urgent treatment center where she was given epinephrine and Benadryl for a possible allergic reaction.  States that she is allergic to mushrooms but otherwise has no known allergies and has not had any exposure to mushrooms. Patient endorses since receiving the epi she has also had \"kidney pain.\" She does endorse chills and denies taking any other medications prior to arrival.  Family member at bedside does note that multiple family members have had upper respiratory infections over the last few days. Patient denies nausea, vomiting, pregnancy, dysuria, hematuria, shortness of breath, chest pain, dizziness, neck pain.        History provided by:  Patient and relative   used: No      Review of Systems   Constitutional:  Positive for chills. Negative for fatigue and fever.   HENT:  Positive for sore throat and trouble swallowing. Negative for congestion.    Respiratory:  Negative for shortness of breath.    Cardiovascular:  Negative for chest pain.   Gastrointestinal:  Negative for abdominal pain.   Genitourinary:  Positive for flank pain. Negative for dysuria.   Musculoskeletal:  Negative for back pain.   Skin:  Negative for rash.   Neurological:  Negative for headaches.   Psychiatric/Behavioral:  Negative for agitation and confusion.    All other systems reviewed and are negative.    Past Medical History:   Diagnosis Date    ADHD     Anxiety     Bipolar disorder     Depression     GERD (gastroesophageal reflux disease)     Kidney infection  "    Kidney stones        Allergies   Allergen Reactions    Mushroom Anaphylaxis    Latex Rash       Past Surgical History:   Procedure Laterality Date    ADENOIDECTOMY      CHOLECYSTECTOMY      TONSILLECTOMY         Family History   Problem Relation Age of Onset    Sleep apnea Father     No Known Problems Sister     No Known Problems Brother     Atrial fibrillation Maternal Grandmother     Skin cancer Maternal Grandmother     Diabetes Maternal Grandfather     Hypertension Maternal Grandfather     Stroke Maternal Grandfather     Heart attack Maternal Grandfather     No Known Problems Paternal Grandmother     No Known Problems Paternal Grandfather        Social History     Socioeconomic History    Marital status: Single   Tobacco Use    Smoking status: Light Smoker     Packs/day: 0.25     Years: 10.00     Pack years: 2.50     Types: Cigarettes    Smokeless tobacco: Never   Vaping Use    Vaping Use: Every day    Substances: Nicotine    Devices: Disposable   Substance and Sexual Activity    Alcohol use: Yes     Comment: social    Drug use: Yes     Types: Marijuana, Cocaine(coke)    Sexual activity: Yes           Objective   Physical Exam  Vitals and nursing note reviewed.   Constitutional:       Appearance: Normal appearance.   HENT:      Head: Normocephalic and atraumatic.      Right Ear: Tympanic membrane normal.      Left Ear: Tympanic membrane normal.      Nose: Congestion present.      Mouth/Throat:      Mouth: Mucous membranes are moist.      Pharynx: Oropharynx is clear. Posterior oropharyngeal erythema present.   Eyes:      Extraocular Movements: Extraocular movements intact.      Pupils: Pupils are equal, round, and reactive to light.   Neck:      Comments: Tenderness to palpation along cervical lymphadenopathy   Cardiovascular:      Rate and Rhythm: Normal rate and regular rhythm.      Pulses: Normal pulses.      Heart sounds: Normal heart sounds.   Pulmonary:      Effort: Pulmonary  effort is normal.      Breath sounds: Normal breath sounds.   Abdominal:      Palpations: Abdomen is soft.      Tenderness: There is no abdominal tenderness.   Musculoskeletal:         General: Normal range of motion.      Cervical back: Normal range of motion and neck supple.   Skin:     General: Skin is warm and dry.      Capillary Refill: Capillary refill takes less than 2 seconds.   Neurological:      General: No focal deficit present.      Mental Status: She is alert and oriented to person, place, and time.   Psychiatric:         Mood and Affect: Mood normal.         Behavior: Behavior normal.       Procedures           ED Course  ED Course as of 08/27/23 1648   Sun Aug 27, 2023   1255 Temp(!): 100.6 øF (38.1 øC) [MA]   1403 Strep A Ag: Negative [MA]   1403 Monospot: Negative [MA]   1428 HCG Quantitative: <0.10 [MA]   1511 COVID PRE-OP / PRE-PROCEDURE SCREENING ORDER (NO ISOLATION) - Swab, Nasopharynx  negative [MA]   1551 Blood, UA(!): Large (3+) [MA]   1552 Leukocytes, UA: Negative [MA]   1557 On reevaluation the patient she endorsed improvement of her throat pain with the medications given.  Patient continued to be on room air with no difficulty breathing.  There is low suspicion at this time although with patient's cervical lymphadenopathy, fever, sore throat is likely the patient is experiencing a viral pharyngitis.  She received Decadron here in the emergency department and advised patient that the steroid would continue to work for the next 48 hours. [MA]   1607 Squamous Epithelial Cells, UA(!): 13-20  Contaminated sample [MA]      ED Course User Index  [MA] Lucita Reyez PA-C                                           Medical Decision Making    Silvia Bravo  is a 28 yrs old female  presents today for a sore throat.  Upon arrival to the ER patient was febrile, nontoxic-appearing, hemodynamically stable.  Physical exam revealed congestion, posterior oropharynx erythema, tenderness palpation along  cervical lymph nodes.    Based on her history and physical exam, my differential diagnosis included several life threatening conditions including URI, strep pharyngitis, viral pharyngitis, mononucleosis, allergic reaction, flu, COVID-19. Ruling out the most morbid conditions drove my clinical assessment.     In order to fully explore differential these tests and treatments were ordered.    Orders Placed This Encounter      COVID PRE-OP / PRE-PROCEDURE SCREENING ORDER (NO ISOLATION) - Swab, Nasopharynx      Rapid Strep A Screen - Swab, Throat      COVID-19 and FLU A/B PCR - Swab, Nasopharynx      Mononucleosis Screen      hCG, Quantitative, Pregnancy      Urinalysis With Microscopic If Indicated (No Culture) - Urine, Clean Catch     Medications  dexAMETHasone (DECADRON) IVPB 10 mg (has no administration in time range)  acetaminophen (TYLENOL) tablet 1,000 mg (has no administration in time range)  lactated ringers bolus 1,000 mL (has no administration in time range)     Old records for this patient including urgent treatment center visit were reviewed and found to be pertinent.  Records review indicates that the patient was seen in urgent treatment center earlier today where there was concern for possible anaphylactic reaction and the patient was given epinephrine as well as Benadryl.    It should be noted that her chronic conditions includes ADHD, anxiety, bipolar disorder, GERD, which currently is at goal therapy.  This complicates her clinical picture because it may be contributing to current presentation.     Lab results were reviewed independently and can be interpreted as negative strep swab, negative Monospot, negative hCG, negative flu and COVID.  Patient's UA did have multiple red blood cells with patient does note that she currently is on her menstrual period.     Patient reevaluated after treatments provided and condition was improved  upon my reevaluation.  Advised patient and family member at bedside  regarding laboratory results. On reevaluation the patient she endorsed improvement of her throat pain with the medications given.  Patient continued to be on room air with no difficulty breathing. There is low suspicion at this time although with patient's cervical lymphadenopathy, fever, sore throat is likely the patient is experiencing a viral pharyngitis.  She received Decadron here in the emergency department and advised patient that the steroid would continue to work for the next 48 hours.  Encourage patient to take Tylenol and ibuprofen as needed for pain relief at home.  Patient was agreeable to care plan and stable prior to discharge.    Ultimately, this patient was discharged.   DISCHARGE    Patient discharged in stable condition.    Reviewed implications of results, diagnosis, meds, responsibility to follow up, warning signs and symptoms of possible worsening, potential complications and reasons to return to ER.    Patient/Family voiced understanding of above instructions.    Discussed plan for discharge, as there is no emergent indication for admission.  Pt/family is agreeable and understands need for follow up and possible repeat testing.  Pt/family is aware that discharge does not mean that nothing is wrong but that it indicates no emergency is currently present that requires admission and they must continue care with follow-up as given below or with a physician of their choice.     FOLLOW-UP  Mason Duarte MD[address] [facility] [phone]       Medication List      No changes were made to your prescriptions during this visit.             Problems Addressed:  Acute viral pharyngitis: complicated acute illness or injury    Amount and/or Complexity of Data Reviewed  Labs: ordered. Decision-making details documented in ED Course.    Risk  OTC drugs.        Final diagnoses:   Acute viral pharyngitis       ED Disposition  ED Disposition       ED Disposition   Discharge    Condition    Stable    Comment   --               Mason Duarte MD  [address] [place] [id number]  [phone]               Medication List      No changes were made to your prescriptions during this visit.            Lucita Reyez PA-C  08/27/23 1604       Lucita Reyez PA-C  08/27/23 [id number]

## 2023-08-27 NOTE — DISCHARGE INSTRUCTIONS
Please take Tylenol and ibuprofen as needed for pain relief at home.  Received a dose of steroids here in the emergency department that we will continue to work for the next 48 to 72 hours.  Your strep culture will also be sent off and you will be notified if you need further antibiotics.  Please follow-up with your primary care doctor for reevaluation.  Return to the emergency department if you have any shortness of breath, chest pain, nausea, vomiting.

## 2023-08-28 LAB — BACTERIA SPEC AEROBE CULT: ABNORMAL

## 2024-01-05 ENCOUNTER — OFFICE VISIT (OUTPATIENT)
Dept: FAMILY MEDICINE CLINIC | Facility: CLINIC | Age: 29
End: 2024-01-05
Payer: COMMERCIAL

## 2024-01-05 VITALS
WEIGHT: 126.4 LBS | DIASTOLIC BLOOD PRESSURE: 78 MMHG | HEIGHT: 61 IN | BODY MASS INDEX: 23.86 KG/M2 | SYSTOLIC BLOOD PRESSURE: 120 MMHG

## 2024-01-05 DIAGNOSIS — F90.2 ATTENTION DEFICIT HYPERACTIVITY DISORDER (ADHD), COMBINED TYPE: Primary | ICD-10-CM

## 2024-01-05 DIAGNOSIS — Z51.81 MEDICATION MONITORING ENCOUNTER: ICD-10-CM

## 2024-01-05 RX ORDER — LISDEXAMFETAMINE DIMESYLATE CAPSULES 20 MG/1
20 CAPSULE ORAL EVERY MORNING
Qty: 30 CAPSULE | Refills: 0 | Status: SHIPPED | OUTPATIENT
Start: 2024-01-05

## 2024-01-05 RX ORDER — LISDEXAMFETAMINE DIMESYLATE CAPSULES 20 MG/1
20 CAPSULE ORAL EVERY MORNING
COMMUNITY
End: 2024-01-05 | Stop reason: SDUPTHER

## 2024-01-05 RX ORDER — PSEUDOEPHED/ACETAMINOPH/DIPHEN 30MG-500MG
TABLET ORAL
COMMUNITY
Start: 2023-12-26

## 2024-01-05 RX ORDER — TRAZODONE HYDROCHLORIDE 100 MG/1
100 TABLET ORAL NIGHTLY
Qty: 90 TABLET | Refills: 1 | Status: SHIPPED | OUTPATIENT
Start: 2024-01-05

## 2024-01-05 RX ORDER — CYCLOBENZAPRINE HCL 10 MG
TABLET ORAL
COMMUNITY
Start: 2023-11-29 | End: 2024-01-05

## 2024-01-05 RX ORDER — ATOMOXETINE 80 MG/1
CAPSULE ORAL
COMMUNITY
Start: 2023-12-04 | End: 2024-01-05

## 2024-01-05 RX ORDER — LAMOTRIGINE 25 MG/1
50 TABLET ORAL DAILY
COMMUNITY
Start: 2023-12-26

## 2024-01-05 NOTE — ASSESSMENT & PLAN NOTE
Psychological condition is worsening.  Will resume her stimulant therapy with Vyvanse 20 mg daily.  She should continue psychologic counseling.  [facility] CSA requested PDMP reviewed  Psychological condition  will be reassessed in 3 months.

## 2024-01-15 LAB — DRUGS UR: NORMAL

## 2024-02-05 ENCOUNTER — OFFICE VISIT (OUTPATIENT)
Dept: FAMILY MEDICINE CLINIC | Facility: CLINIC | Age: 29
End: 2024-02-05
Payer: COMMERCIAL

## 2024-02-05 VITALS
HEIGHT: 61 IN | HEART RATE: 106 BPM | OXYGEN SATURATION: 97 % | DIASTOLIC BLOOD PRESSURE: 64 MMHG | WEIGHT: 123.4 LBS | BODY MASS INDEX: 23.3 KG/M2 | SYSTOLIC BLOOD PRESSURE: 110 MMHG

## 2024-02-05 DIAGNOSIS — N76.0 BACTERIAL VAGINOSIS: ICD-10-CM

## 2024-02-05 DIAGNOSIS — B37.31 VAGINAL CANDIDIASIS: ICD-10-CM

## 2024-02-05 DIAGNOSIS — B96.89 BACTERIAL VAGINOSIS: ICD-10-CM

## 2024-02-05 DIAGNOSIS — A54.9 GONORRHEA: Primary | ICD-10-CM

## 2024-02-05 PROBLEM — F31.9 BIPOLAR DISORDER: Status: ACTIVE | Noted: 2023-03-29

## 2024-02-05 LAB
B-HCG UR QL: NEGATIVE
EXPIRATION DATE: NORMAL
INTERNAL NEGATIVE CONTROL: NORMAL
INTERNAL POSITIVE CONTROL: NORMAL
Lab: NORMAL

## 2024-02-05 PROCEDURE — 99213 OFFICE O/P EST LOW 20 MIN: CPT | Performed by: FAMILY MEDICINE

## 2024-02-05 PROCEDURE — 96372 THER/PROPH/DIAG INJ SC/IM: CPT | Performed by: FAMILY MEDICINE

## 2024-02-05 PROCEDURE — 81025 URINE PREGNANCY TEST: CPT | Performed by: FAMILY MEDICINE

## 2024-02-05 PROCEDURE — 1160F RVW MEDS BY RX/DR IN RCRD: CPT | Performed by: FAMILY MEDICINE

## 2024-02-05 PROCEDURE — 1159F MED LIST DOCD IN RCRD: CPT | Performed by: FAMILY MEDICINE

## 2024-02-05 RX ORDER — CEFTRIAXONE 1 G/1
1 INJECTION, POWDER, FOR SOLUTION INTRAMUSCULAR; INTRAVENOUS ONCE
Status: COMPLETED | OUTPATIENT
Start: 2024-02-05 | End: 2024-02-05

## 2024-02-05 RX ORDER — METRONIDAZOLE 65 MG/5G
GEL TOPICAL
COMMUNITY
Start: 2024-02-05

## 2024-02-05 RX ADMIN — CEFTRIAXONE 1 G: 1 INJECTION, POWDER, FOR SOLUTION INTRAMUSCULAR; INTRAVENOUS at 15:22

## 2024-02-05 NOTE — PROGRESS NOTES
Office Note     Name: Silvia Bravo    : 1995     MRN: [id number]     Chief Complaint  Exposure to STD    Subjective     History of Present Illness:  Silvia Bravo is a 28 y.o. female who presents today for follow-up on STD screening.    Was seen in urgent care last week after developing symptoms of vaginal discharge.  She was found to be positive for bacterial vaginosis, vaginal candidiasis, and gonorrhea.  Patient reports that she has had a previous history of chlamydia and underwent treatment.  She presents today with her partner and they both plan to be treated for gonorrhea.  She states that she does have Flagyl and Diflucan available at the pharmacy for pickup but plans to go later today.  She denies any other acute complaints today.    Review of Systems:   Review of Systems   Genitourinary:  Positive for vaginal discharge.   All other systems reviewed and are negative.      Past Medical History:   Past Medical History:   Diagnosis Date    ADHD     Anxiety     Bipolar disorder     Depression     GERD (gastroesophageal reflux disease)     Kidney infection     Kidney stones        Past Surgical History:   Past Surgical History:   Procedure Laterality Date    ADENOIDECTOMY      CHOLECYSTECTOMY      TONSILLECTOMY         Family History:   Family History   Problem Relation Age of Onset    Sleep apnea Father     No Known Problems Sister     No Known Problems Brother     Atrial fibrillation Maternal Grandmother     Skin cancer Maternal Grandmother     Diabetes Maternal Grandfather     Hypertension Maternal Grandfather     Stroke Maternal Grandfather     Heart attack Maternal Grandfather     No Known Problems Paternal Grandmother     No Known Problems Paternal Grandfather        Social History:   Social History     Socioeconomic History    Marital status: Single   Tobacco Use    Smoking status: Light Smoker     Packs/day: 0.25     Years: 10.00     Additional pack years: 0.00     Total pack years:  2.50     Types: Cigarettes    Smokeless tobacco: Never   Vaping Use    Vaping Use: Every day    Substances: Nicotine    Devices: Disposable   Substance and Sexual Activity    Alcohol use: Yes     Comment: social    Drug use: Yes     Types: Marijuana, Cocaine(coke)    Sexual activity: Yes       Immunizations:   Immunization History   Administered Date(s) Administered    COVID-19 (PFIZER) Purple Cap Monovalent 01/26/2022    Flu Vaccine Quad PF >36MO 11/03/2014, 10/09/2017, 10/01/2019    Flu Vaccine Split Quad 10/09/2017    FluMist 2-49yrs 01/03/2014    Fluzone (or Fluarix & Flulaval for VFC) >6mos 11/03/2014, 10/09/2017    Fluzone Quad >6mos (Multi-dose) 10/01/2019    H1N1 All Forms 03/23/2010    HPV Quadrivalent 08/04/2009, 10/06/2009, 03/23/2010    Hep A, 2 Dose 01/03/2014    Influenza Quad Vaccine (Inpatient) 10/06/2009, 09/27/2010, 02/10/2012, 10/29/2012    MCV4 Unspecified 10/06/2009, 01/03/2014    Meningococcal MCV4P (Menactra) 10/06/2009, 01/03/2014    Tdap 07/03/2008, 07/19/2017    Varicella 07/03/2008        Medications:     Current Outpatient Medications:     Acetaminophen Extra Strength 500 MG tablet, GIVE 1 TABLET BY MOUTH EVERY 4 HOURS AS NEEDED, Disp: , Rfl:     albuterol sulfate  (90 Base) MCG/ACT inhaler, Inhale 2 puffs Every 4 (Four) Hours As Needed for Shortness of Air or Wheezing., Disp: 8 g, Rfl: 0    fluconazole (DIFLUCAN) 150 MG tablet, May repeat 2nd dose 72hrs after the first, Disp: 2 tablet, Rfl: 0    folic acid (FOLVITE) 1 MG tablet, , Disp: , Rfl:     ibuprofen (ADVIL,MOTRIN) 800 MG tablet, Take 1 tablet by mouth Every 8 (Eight) Hours As Needed for Moderate Pain., Disp: 30 tablet, Rfl: 0    lamoTRIgine (LaMICtal) 25 MG tablet, Take 2 tablets by mouth Daily., Disp: , Rfl:     lamoTRIgine (LaMICtal) 50 MG tablet dispersible disintegrating tablet, DISSOLVE 1 TABLET UNDER TONGUE ONCE A DAY IN THE MORNING, Disp: , Rfl:     lisdexamfetamine (Vyvanse) 20 MG capsule, Take 1 capsule by mouth  "Every Morning, Disp: 30 capsule, Rfl: 0    Magnesium Oxide -Mg Supplement 500 MG tablet, , Disp: , Rfl:     naloxone (NARCAN) 4 MG/0.1ML nasal spray, , Disp: , Rfl:     Nuvessa 1.3 % gel, , Disp: , Rfl:     traZODone (DESYREL) 100 MG tablet, Take 1 tablet by mouth Every Night., Disp: 90 tablet, Rfl: 1    traZODone (DESYREL) 50 MG tablet, take 1 tablet by mouth once a day at bedtime, Disp: , Rfl:     Current Facility-Administered Medications:     cefTRIAXone (ROCEPHIN) injection 1 g, 1 g, Intramuscular, Once, Lucy Hernandez DO    Allergies:   Allergies   Allergen Reactions    Mushroom Anaphylaxis    Latex Rash       Objective     Vital Signs  /64   Pulse 106   Ht 154.9 cm (60.98\")   Wt 56 kg (123 lb 6.4 oz)   SpO2 97%   BMI 23.33 kg/m²   Estimated body mass index is 23.33 kg/m² as calculated from the following:    Height as of this encounter: 154.9 cm (60.98\").    Weight as of this encounter: 56 kg (123 lb 6.4 oz).    BMI is within normal parameters. No other follow-up for BMI required.       Physical Exam  Vitals and nursing note reviewed.   Constitutional:       Appearance: Normal appearance. She is normal weight.   HENT:      Head: Normocephalic and atraumatic.      Nose: Nose normal.      Mouth/Throat:      Mouth: Mucous membranes are moist.   Eyes:      Extraocular Movements: Extraocular movements intact.      Pupils: Pupils are equal, round, and reactive to light.   Cardiovascular:      Rate and Rhythm: Normal rate and regular rhythm.      Heart sounds: Normal heart sounds.   Pulmonary:      Effort: Pulmonary effort is normal.      Breath sounds: Normal breath sounds.   Abdominal:      General: Abdomen is flat.   Musculoskeletal:         General: Normal range of motion.      Cervical back: Normal range of motion.   Skin:     General: Skin is warm.   Neurological:      General: No focal deficit present.      Mental Status: She is alert and oriented to person, place, and time.   Psychiatric:    "      Mood and Affect: Mood normal.         Behavior: Behavior normal.         Thought Content: Thought content normal.         Judgment: Judgment normal.            Procedures     Results:  Recent Results (from the past 24 hour(s))   POCT pregnancy, urine    Collection Time: 02/05/24  3:10 PM    Specimen: Urine   Result Value Ref Range    HCG, Urine, QL Negative Negative    Lot Number [id number]     Internal Positive Control Passed Positive, Passed    Internal Negative Control Passed Negative, Passed    Expiration Date 03/11/2025         Assessment and Plan     Assessment/Plan:  Diagnoses and all orders for this visit:    1. Gonorrhea (Primary)  Assessment & Plan:  Rocephin injection given today.  Advised patient to repeat testing in 3 months.    Orders:  -     POCT pregnancy, urine  -     cefTRIAXone (ROCEPHIN) injection 1 g    2. Bacterial vaginosis  Assessment & Plan:  Flagyl has been sent to pharmacy and patient plans to initiate this soon.  Also recommended daily probiotic to help prevent recurrence.      3. Vaginal candidiasis  Assessment & Plan:  Diflucan sent to pharmacy.          Follow Up  Return in about 3 months (around 5/5/2024) for Recheck.    Lucy Hernandez DO   [facility]

## 2024-02-05 NOTE — ASSESSMENT & PLAN NOTE
Flagyl has been sent to pharmacy and patient plans to initiate this soon.  Also recommended daily probiotic to help prevent recurrence.

## 2024-08-01 ENCOUNTER — INITIAL PRENATAL (OUTPATIENT)
Dept: OBSTETRICS AND GYNECOLOGY | Facility: CLINIC | Age: 29
End: 2024-08-01
Payer: COMMERCIAL

## 2024-08-01 VITALS — BODY MASS INDEX: 24.01 KG/M2 | WEIGHT: 127 LBS | DIASTOLIC BLOOD PRESSURE: 58 MMHG | SYSTOLIC BLOOD PRESSURE: 110 MMHG

## 2024-08-01 DIAGNOSIS — Z34.81 PRENATAL CARE, SUBSEQUENT PREGNANCY, FIRST TRIMESTER: Primary | ICD-10-CM

## 2024-08-01 DIAGNOSIS — F10.21 ALCOHOL DEPENDENCE IN REMISSION: ICD-10-CM

## 2024-08-01 RX ORDER — PRENATAL VIT NO.126/IRON/FOLIC 28MG-0.8MG
TABLET ORAL DAILY
COMMUNITY
End: 2024-08-01

## 2024-08-01 RX ORDER — CALCIUM CARBONATE 300MG(750)
1 TABLET,CHEWABLE ORAL DAILY
Qty: 100 TABLET | Refills: 2 | Status: SHIPPED | OUTPATIENT
Start: 2024-08-01

## 2024-08-01 RX ORDER — CHOLECALCIFEROL (VITAMIN D3) 50 MCG
1 TABLET ORAL DAILY
Qty: 30 EACH | Refills: 12 | Status: SHIPPED | OUTPATIENT
Start: 2024-08-01

## 2024-08-01 NOTE — PROGRESS NOTES
Initial ob visit     CC- Here for care of pregnancy        Silvia Bravo is a 29 y.o. female, , who presents for her first obstetrical visit.  Patient's last menstrual period was 2024.. Her MIKAYLA is 3/10/2025, by Last Menstrual Period. Current GA is 8w3d.     Initial positive test date : 2024, UPT        Her periods are every 28-32 days.  Prior obstetric issues: hx of EAB x2, IOL at 36wks with living child-states had cholecystectomy immediately after.   Patient's past medical history is significant for:  anxiety/depression, bipolar disorder-patient stopped lamictal, trazodone and vyvanse with + UPT, hx of drug abuse and overdose .  Family history of genetic issues (includes FOB): patient states previous baby with Ferrara Syndrome-EAB at 16.5wks  Prior infections concerning in pregnancy (Rash, fever in last 2 weeks): No  Varicella Hx - vaccinated  Prior testing for Cystic Fibrosis Carrier or Sickle Cell Trait- no  Prepregnancy BMI - Body mass index is 24.01 kg/m².  History of STD: yes  syphillis  Hx of HSV for patient or partner: yes - patient with hx of HSV  Ultrasound Today: yes    OB History    Para Term  AB Living   4 1   1 2 1   SAB IAB Ectopic Molar Multiple Live Births     2       1      # Outcome Date GA Lbr Srinivasa/2nd Weight Sex Type Anes PTL Lv   4 Current            3 IAB 22           2 IAB 20           1  17 36w0d  2892 g (6 lb 6 oz) M Vag-Spont  N NOEMY       Additional Pertinent History   Last Pap : 2 years ago Result: unknown  HPV: unknown      Last Completed Pap Smear            Ordered - PAP SMEAR (Every 3 Years) Ordered on 2021  Patient-Reported (Performed Externally) - Dr. Orellana at [facility]                  History of abnormal Pap smear: no  Family history of uterine, colon, breast, or ovarian cancer: yes - family hx of breast and ovarian CA  Feelings of Anxiety or Depression: yes - anxiety/depression/bipolar  disorder. Patient states stopped meds with + upt and complaining of worsening depression. States does not have to desire to get up and get ready most days. She is trying to get back into sober living.   Tobacco Usage?: No   Alcohol/Drug Use?: YES  patient admits to alcohol and drug use.  last used cocaine and xanax 4 days ago. Patient is trying to get back into sober living. Hx of drug overdose 2023 and 2024 with arrest.   Over the age of 35 at delivery: no  Genetic Screening: desires cell free DNA  Flu Status: Declines    PMH    Current Outpatient Medications:     Acetaminophen Extra Strength 500 MG tablet, GIVE 1 TABLET BY MOUTH EVERY 4 HOURS AS NEEDED (Patient not taking: Reported on 8/1/2024), Disp: , Rfl:     albuterol sulfate  (90 Base) MCG/ACT inhaler, Inhale 2 puffs Every 4 (Four) Hours As Needed for Shortness of Air or Wheezing. (Patient not taking: Reported on 8/1/2024), Disp: 8 g, Rfl: 0    fluconazole (DIFLUCAN) 150 MG tablet, May repeat 2nd dose 72hrs after the first (Patient not taking: Reported on 8/1/2024), Disp: 2 tablet, Rfl: 0    ibuprofen (ADVIL,MOTRIN) 800 MG tablet, Take 1 tablet by mouth Every 8 (Eight) Hours As Needed for Moderate Pain. (Patient not taking: Reported on 8/1/2024), Disp: 30 tablet, Rfl: 0    lamoTRIgine (LaMICtal) 25 MG tablet, Take 2 tablets by mouth Daily. (Patient not taking: Reported on 8/1/2024), Disp: , Rfl:     lamoTRIgine (LaMICtal) 50 MG tablet dispersible disintegrating tablet, DISSOLVE 1 TABLET UNDER TONGUE ONCE A DAY IN THE MORNING (Patient not taking: Reported on 8/1/2024), Disp: , Rfl:     lisdexamfetamine (Vyvanse) 20 MG capsule, Take 1 capsule by mouth Every Morning (Patient not taking: Reported on 8/1/2024), Disp: 30 capsule, Rfl: 0    Magnesium Oxide -Mg Supplement 500 MG tablet, , Disp: , Rfl:     naloxone (NARCAN) 4 MG/0.1ML nasal spray, , Disp: , Rfl:     Nuvessa 1.3 % gel, , Disp: , Rfl:     Prenatal MV-Min-FA-Omega-3 (Prenatal Gummies/DHA &  "FA) 0.4-32.5 MG chewable tablet, Chew 1 tablet Daily., Disp: 100 tablet, Rfl: 2    Prenatal MV-Min-Fe Fum-FA-DHA (Prenatal Multivitamin + DHA) 28-0.8 & 200 MG misc, Take 1 tablet by mouth Daily., Disp: 30 each, Rfl: 12    traZODone (DESYREL) 100 MG tablet, Take 1 tablet by mouth Every Night. (Patient not taking: Reported on 8/1/2024), Disp: 90 tablet, Rfl: 1    traZODone (DESYREL) 50 MG tablet, take 1 tablet by mouth once a day at bedtime (Patient not taking: Reported on 8/1/2024), Disp: , Rfl:      Past Medical History:   Diagnosis Date    ADHD     Anxiety     Bipolar disorder     Depression     GERD (gastroesophageal reflux disease)     History of drug overdose 04/2024    cocaine and xanax    History of palpitations     History of syphilis 11/2023    History of thyroid cyst 05/2022    \"She has tiny thyroid cysts. These are too small to cause symptoms.\"    Kidney infection     Kidney stones     Ventricular premature beats         Past Surgical History:   Procedure Laterality Date    ADENOIDECTOMY      CHOLECYSTECTOMY      TONSILLECTOMY         Review of Systems   Review of Systems    Patient Reports: excessive nausea   Patient Denies:excessive vomiting and vaginal bleeding  All systems reviewed and otherwise normal.    I have reviewed and agree with the HPI, ROS, and historical information as entered above. Perfecto Lindsay MD      /58   Wt 57.6 kg (127 lb)   LMP 06/03/2024   BMI 24.01 kg/m²     The additional following portions of the patient's history were reviewed and updated as appropriate: allergies, current medications, past family history, past medical history, past social history, and past surgical history.    Physical Exam  General:  well developed; well nourished  no acute distress   Chest/Respiratory: No labored breathing, normal respiratory effort, normal appearance, no respiratory noises noted   Heart:  normal rate, regular rhythm,  no murmurs, rubs, or gallops   Thyroid: normal to inspection " and palpation   Breasts:  Not performed.   Abdomen: soft, non-tender; no masses  no umbilical or inguinal hernias are present  no hepato-splenomegaly   Pelvis: Uterus:  symmetrically enlarged, consisent with 8 weeks size;        Assessment and Plan    Problem List Items Addressed This Visit    None  Visit Diagnoses       Prenatal care, subsequent pregnancy, first trimester    -  Primary    Relevant Orders    Obstetric Panel    HIV-1 / O / 2 Ag / Antibody    Urine Culture - Urine, Urine, Clean Catch    Urine Drug Screen - Urine, Clean Catch    LIQUID-BASED PAP SMEAR WITH HPV GENOTYPING REGARDLESS OF INTERPRETATION (CHAMP,COR,MAD)    [id number] PLUS Core+SCA+ESS - Blood,    Alcohol dependence in remission                Pregnancy at 8w3d  Patient counseled that some of her meds may be okay.  She will see her local MD about getting them reinstated.  Were going to do labs in a week with maternity added since she will be 9 weeks then.  Ultrasound today showed 8-week 3-day baby that seems healthy and viable.  Return in about 1 month (around 9/1/2024) for Recheck.      Perfecto Lindsay MD  08/01/2024

## 2024-08-08 LAB — REF LAB TEST METHOD: NORMAL

## 2024-08-13 ENCOUNTER — TELEPHONE (OUTPATIENT)
Dept: OBSTETRICS AND GYNECOLOGY | Facility: CLINIC | Age: 29
End: 2024-08-13
Payer: COMMERCIAL

## 2024-08-13 NOTE — TELEPHONE ENCOUNTER
Jolie, a nurse with [facility] in [place], returned my call. States patient was admitted to their facility 08/10/24.   Patient had NOB visit with Dr. Lindsay 08/01/24 but did not have her prenatal labs drawn.   Jolie states patient wants to have her labs, including NIPS, drawn ASAP because she had a previous pregnancy that she terminated because of a genetic abnormality.   Asking if they could take the patient to [facility] to have them drawn.   Informed her that we could send an order for prenatal labs to be done there but I don't think we can have the NIPS done there; it requires additional forms/information. Patient can also wait until next prenatal visit to have them done.   She v/u and will plan to bring patient here to have all labs done.

## 2024-08-13 NOTE — TELEPHONE ENCOUNTER
Caller: FATUMA    Relationship:     Best call back number: [phone]    What orders are you requesting (i.e. lab or imaging): ACTIVE LAB ORDERS IN CHART, LABS THAT WERE ORDERED AT INITIAL OB APPT.    In what timeframe would the patient need to come in: ASAP SO THEY CAN BE DONE BY PATIENTS NEXT APPT WITH DR BADILLO ON 08/29    Where will you receive your lab/imaging services: THEY PLAN TO TAKE HER TO [facility] IN [place]    Additional notes: FATUMA WOULD LIKE A CALL BACK TO CONFIRM THAT IT IS OKAY FOR PATIENT TO GET LABS DONE AT THE [facility] BEFORE HER NEXT APPT.

## 2024-08-15 ENCOUNTER — LAB (OUTPATIENT)
Dept: OBSTETRICS AND GYNECOLOGY | Facility: CLINIC | Age: 29
End: 2024-08-15
Payer: COMMERCIAL

## 2024-08-16 LAB
ABO GROUP BLD: ABNORMAL
AMPHETAMINES UR QL SCN: NEGATIVE NG/ML
BARBITURATES UR QL SCN: NEGATIVE NG/ML
BASOPHILS # BLD AUTO: 0 X10E3/UL (ref 0–0.2)
BASOPHILS NFR BLD AUTO: 0 %
BENZODIAZ UR QL SCN: NEGATIVE NG/ML
BLD GP AB SCN SERPL QL: NEGATIVE
BZE UR QL SCN: NEGATIVE NG/ML
CANNABINOIDS UR QL SCN: NEGATIVE NG/ML
CREAT UR-MCNC: 88.4 MG/DL (ref 20–300)
EOSINOPHIL # BLD AUTO: 0.3 X10E3/UL (ref 0–0.4)
EOSINOPHIL NFR BLD AUTO: 2 %
ERYTHROCYTE [DISTWIDTH] IN BLOOD BY AUTOMATED COUNT: 13 % (ref 11.7–15.4)
HBV SURFACE AG SERPL QL IA: NEGATIVE
HCT VFR BLD AUTO: 40.2 % (ref 34–46.6)
HCV IGG SERPL QL IA: NON REACTIVE
HGB BLD-MCNC: 12.2 G/DL (ref 11.1–15.9)
HIV 1+2 AB+HIV1 P24 AG SERPL QL IA: NON REACTIVE
IMM GRANULOCYTES # BLD AUTO: 0.1 X10E3/UL (ref 0–0.1)
IMM GRANULOCYTES NFR BLD AUTO: 1 %
LABORATORY COMMENT REPORT: NORMAL
LYMPHOCYTES # BLD AUTO: 2.7 X10E3/UL (ref 0.7–3.1)
LYMPHOCYTES NFR BLD AUTO: 21 %
MCH RBC QN AUTO: 27.8 PG (ref 26.6–33)
MCHC RBC AUTO-ENTMCNC: 30.3 G/DL (ref 31.5–35.7)
MCV RBC AUTO: 92 FL (ref 79–97)
METHADONE UR QL SCN: NEGATIVE NG/ML
MONOCYTES # BLD AUTO: 0.5 X10E3/UL (ref 0.1–0.9)
MONOCYTES NFR BLD AUTO: 4 %
NEUTROPHILS # BLD AUTO: 9.6 X10E3/UL (ref 1.4–7)
NEUTROPHILS NFR BLD AUTO: 72 %
OPIATES UR QL SCN: NEGATIVE NG/ML
OXYCODONE+OXYMORPHONE UR QL SCN: NEGATIVE NG/ML
PCP UR QL: NEGATIVE NG/ML
PH UR: 6.4 [PH] (ref 4.5–8.9)
PLATELET # BLD AUTO: 377 X10E3/UL (ref 150–450)
PROPOXYPH UR QL SCN: NEGATIVE NG/ML
RBC # BLD AUTO: 4.39 X10E6/UL (ref 3.77–5.28)
RH BLD: POSITIVE
RPR SER QL: NON REACTIVE
RUBV IGG SERPL IA-ACNC: 3.89 INDEX
WBC # BLD AUTO: 13.1 X10E3/UL (ref 3.4–10.8)

## 2024-08-17 LAB
BACTERIA UR CULT: NORMAL
BACTERIA UR CULT: NORMAL

## 2024-08-20 ENCOUNTER — REFERRAL TRIAGE (OUTPATIENT)
Dept: LABOR AND DELIVERY | Facility: HOSPITAL | Age: 29
End: 2024-08-20
Payer: COMMERCIAL

## 2024-08-29 ENCOUNTER — ROUTINE PRENATAL (OUTPATIENT)
Dept: OBSTETRICS AND GYNECOLOGY | Facility: CLINIC | Age: 29
End: 2024-08-29
Payer: COMMERCIAL

## 2024-08-29 VITALS — SYSTOLIC BLOOD PRESSURE: 100 MMHG | BODY MASS INDEX: 25.71 KG/M2 | WEIGHT: 136 LBS | DIASTOLIC BLOOD PRESSURE: 52 MMHG

## 2024-08-29 DIAGNOSIS — Z34.81 PRENATAL CARE, SUBSEQUENT PREGNANCY, FIRST TRIMESTER: Primary | ICD-10-CM

## 2024-08-29 LAB
GLUCOSE UR STRIP-MCNC: NEGATIVE MG/DL
PROT UR STRIP-MCNC: NEGATIVE MG/DL

## 2024-08-29 RX ORDER — HYDROXYZINE PAMOATE 25 MG/1
CAPSULE ORAL
COMMUNITY
Start: 2024-08-21

## 2024-08-29 NOTE — PROGRESS NOTES
OB FOLLOW UP  CC- Here for care of pregnancy        Silvia Bravo is a 29 y.o.  12w3d patient being seen today for her obstetrical follow up visit. Patient reports occasional nausea-thinks r/t PNV-asking for RX for gummy vitamin. Patient has been sober since 8/10/24. States finishing rehab program next week and plans to move to sober living after. She is c/o 'wild dreams and obsessive thoughts' but unsure about restarting mental health medications.      Her prenatal care is complicated by (and status) :   Patient Active Problem List   Diagnosis    Palpitations    Allergic rhinitis    Eustachian tube disorder    Generalized anxiety disorder    Pelvic and perineal pain    Ventricular premature beats    Bipolar disorder    Functional diarrhea    Effusion of right prepatellar bursa    Sinusitis    Multiple thyroid nodules    Attention deficit hyperactivity disorder (ADHD), combined type    Gonorrhea    Bacterial vaginosis    Vaginal candidiasis       Genetic testing?: already completed and was normal.  NOB labs reviewed  Ultrasound Today: No    ROS -   Patient Denies: leaking of fluid, vaginal bleeding, and more than 6 contractions per hour  All other systems reviewed and are negative.     The additional following portions of the patient's history were reviewed and updated as appropriate: allergies and current medications.    I have reviewed and agree with the HPI, ROS, and historical information as entered above. Perfecto Lindsay MD          /52   Wt 61.7 kg (136 lb)   LMP 2024   BMI 25.71 kg/m²         EXAM:     Prenatal Vitals  BP: 100/52  Weight: 61.7 kg (136 lb)   Fetal Heart Rate: 154          Urine Glucose Read-only: Negative  Urine Protein Read-only: Negative       Assessment and Plan    Problem List Items Addressed This Visit    None  Visit Diagnoses       Prenatal care, subsequent pregnancy, first trimester    -  Primary    Relevant Orders    POC Urinalysis Dipstick (Completed)             Pregnancy at 12w3d  Labs reviewed from New OB Visit.  Counseled on genetic testing, carrier status and option for NT screen  Activity and Exercise discussed.  Patient is on Prenatal vitamins  Off all meds   Return in about 1 month (around 9/29/2024).    Perfecto Lindsay MD  08/29/2024

## 2024-09-01 PROCEDURE — 87086 URINE CULTURE/COLONY COUNT: CPT | Performed by: FAMILY MEDICINE

## 2024-09-02 ENCOUNTER — PATIENT ROUNDING (BHMG ONLY) (OUTPATIENT)
Dept: URGENT CARE | Facility: CLINIC | Age: 29
End: 2024-09-02
Payer: COMMERCIAL

## 2024-09-16 ENCOUNTER — TELEPHONE (OUTPATIENT)
Dept: OBSTETRICS AND GYNECOLOGY | Facility: CLINIC | Age: 29
End: 2024-09-16
Payer: COMMERCIAL

## 2024-09-16 ENCOUNTER — PATIENT OUTREACH (OUTPATIENT)
Dept: LABOR AND DELIVERY | Facility: HOSPITAL | Age: 29
End: 2024-09-16
Payer: COMMERCIAL

## 2024-09-24 ENCOUNTER — PATIENT OUTREACH (OUTPATIENT)
Dept: LABOR AND DELIVERY | Facility: HOSPITAL | Age: 29
End: 2024-09-24
Payer: COMMERCIAL

## 2024-10-03 ENCOUNTER — ROUTINE PRENATAL (OUTPATIENT)
Dept: OBSTETRICS AND GYNECOLOGY | Facility: CLINIC | Age: 29
End: 2024-10-03
Payer: COMMERCIAL

## 2024-10-03 ENCOUNTER — PATIENT OUTREACH (OUTPATIENT)
Dept: LABOR AND DELIVERY | Facility: HOSPITAL | Age: 29
End: 2024-10-03
Payer: COMMERCIAL

## 2024-10-03 VITALS — SYSTOLIC BLOOD PRESSURE: 110 MMHG | WEIGHT: 143 LBS | DIASTOLIC BLOOD PRESSURE: 58 MMHG | BODY MASS INDEX: 27.93 KG/M2

## 2024-10-03 DIAGNOSIS — Z34.82 PRENATAL CARE, SUBSEQUENT PREGNANCY, SECOND TRIMESTER: Primary | ICD-10-CM

## 2024-10-03 NOTE — SIGNIFICANT NOTE
"When asked these questions Lisa stated \"Yes, I put myself in those situations when I'm using.\" Reports feeling safe currently.   "

## 2024-10-03 NOTE — PROGRESS NOTES
OB FOLLOW UP  CC- Here for care of pregnancy        Silvia Bravo is a 29 y.o.  17w3d patient being seen today for her obstetrical follow up visit. Patient reports cramping and sciatica pain. Patient in sober living in [facility] currently. States taking amoxicillin currently for a place on her thigh.     Her prenatal care is complicated by (and status) : see below.  Patient Active Problem List   Diagnosis    Palpitations    Allergic rhinitis    Eustachian tube disorder    Generalized anxiety disorder    Pelvic and perineal pain    Ventricular premature beats    Bipolar disorder    Functional diarrhea    Effusion of right prepatellar bursa    Sinusitis    Multiple thyroid nodules    Attention deficit hyperactivity disorder (ADHD), combined type    Gonorrhea    Bacterial vaginosis    Vaginal candidiasis       Ultrasound Today: No    AFP: declines    ROS -   Patient Denies: leaking of fluid, vaginal bleeding, and more than 6 contractions per hour  All other systems reviewed and are negative.       The additional following portions of the patient's history were reviewed and updated as appropriate: allergies and current medications.      I have reviewed and agree with the HPI, ROS, and historical information as entered above. Perfecto Lindsay MD          EXAM:     Prenatal Vitals  BP: 110/58  Weight: 64.9 kg (143 lb)   Fetal Heart Rate: 145                     Assessment and Plan    Problem List Items Addressed This Visit    None  Visit Diagnoses       Prenatal care, subsequent pregnancy, second trimester    -  Primary            Pregnancy at 17w3d  Fetal status reassuring.   Counseled on MSAFP alone in relation to OTD and placental issues.    Anatomy scan next visit.   Activity and Exercise discussed.  U/S ordered at follow up  Patient is on Prenatal vitamins  Return in about 3 weeks (around 10/24/2024) for us then .    Perfecto Lindsay MD  10/03/2024

## 2024-10-03 NOTE — OUTREACH NOTE
[facility]  Enrollment    Current Estimated Gestational Age: 17w3d    Questions/Answers      Flowsheet Row Responses   Would like to participate? Yes   Date of Intake Visit 10/03/24          [facility]  Intake    Current Estimated Gestational Age: 17w3d    Intake Assessment      Flowsheet Row Responses   Best Method for Contacting Cell   Currently Employed No   Able to keep appointments as scheduled Yes   Gender(s) and Name(s) Manuel   Do you have a dentist? Yes   Have you seen a dentist in the last 6 months No   Resources Presently Utilizing: WIC (Women, Infant, Children), Other   Other Resources Utilizing: BERTHA services   Maternal Warning Signs Provided   Other Education HANDS, How to find a pediatrician, Insurance benefits/Incentives, Mental Health Services, Smoking/Vaping Cessation, SNAP Benefits, Substance Use Disorder Treatment, Transportation Assistance            Learning Assessment      Flowsheet Row Responses   Relationship Patient   Learner Name Lisa   Does the learner have any barriers to learning? No Barriers   What is the preferred language of the learner for medical teaching? English   Is an  required? No   How does the learner prefer to learn new concepts? Reading, Listening            Tobacco, Alcohol, and Drug History     reports that she has been smoking cigarettes. She has a 2.5 pack-year smoking history. She has never used smokeless tobacco.   reports current alcohol use.   reports current drug use. Drugs: Marijuana and Cocaine(coke).    [facility]  Check-In    Current Estimated Gestational Age: 17w3d      Questions/Answers      Flowsheet Row Responses   Best Method for Contacting Cell   Demographics Reviewed Yes   Currently Employed No   Able to keep appointments as scheduled Yes   Gender(s) and Name(s) Manuel   Baby Active/Feeling Fetal Movemen Yes   How are you presently feeling? good   Are you having any of the following symptoms?  Breast Tenderness   Questions regarding prenatal visits or tests to be ordered? No   May I ask you questions about your substance use? Yes   Resource/Environmental Concerns Financial, Reliable Transportation   Do you have any questions related to your care experience, your pregnancy, plans for delivery, any concerns, etc? No   Other Education HANDS, How to find a pediatrician, Insurance benefits/Incentives, Mental Health Services, Smoking/Vaping Cessation, SNAP Benefits, Substance Use Disorder Treatment, Transportation Assistance          Lisa is seeing Dr. Lindsay for her prenatal care. Her history is significant for bipolar disorder, anxiety/depression, HSV and BERTHA resulting in overdose in 2023. Was actively using cocaine and THC up until 2024.     She has experienced trauma in her life in the form of overdose 2023. Currently in residential treatment facility, [facility] in [place], [place].  Will be moving into sober living in the next several weeks, likely in [place]. Henrik is in sober living in [place], [place] currently. She has no SI/HI and currently feels safe. Encouraged to consider seeing a mental health provider. Discussed that we will be screening periodically for  and postpartum changes with mood looking for trends which may need to be addressed. VU.    She is feeling well, reports feeling occasional fetal movement. She is interested in childbirth classes. Prenatal and breastfeeding education discussed. Discussed bonus benefits of pregnancy from insurance for potential assistance with some infant care items.    Previous child in custody of Paternal grandparents in [place], [place].      [facility] reviewed with patient. Issues with financial, food and transportation instability identified today. Reports LENNOX and her grand mother are very supportive and involved.     Multiple resources provided via [employer] message. Contact information provided. Encouraged to call with questions,  concerns, or for support. Will plan f/u around 20 weeks after anatomy scan is completed for wellness and resource needs check in.    Monique Salcido RN  Maternity Nurse Navigator    10/3/2024, 13:03 EDT

## 2024-10-22 ENCOUNTER — ROUTINE PRENATAL (OUTPATIENT)
Dept: OBSTETRICS AND GYNECOLOGY | Facility: CLINIC | Age: 29
End: 2024-10-22
Payer: COMMERCIAL

## 2024-10-22 VITALS — BODY MASS INDEX: 28.71 KG/M2 | SYSTOLIC BLOOD PRESSURE: 110 MMHG | DIASTOLIC BLOOD PRESSURE: 58 MMHG | WEIGHT: 147 LBS

## 2024-10-22 DIAGNOSIS — Z34.82 PRENATAL CARE, SUBSEQUENT PREGNANCY, SECOND TRIMESTER: Primary | ICD-10-CM

## 2024-10-22 LAB
GLUCOSE UR STRIP-MCNC: NEGATIVE MG/DL
PROT UR STRIP-MCNC: NEGATIVE MG/DL

## 2024-10-22 NOTE — PROGRESS NOTES
OB FOLLOW UP  CC- Here for care of pregnancy        Silvia Bravo is a 29 y.o.  20w1d patient being seen today for her obstetrical follow up visit. Patient reports vaginal discharge.     Her prenatal care is complicated by (and status) : see below.  Patient Active Problem List   Diagnosis    Palpitations    Allergic rhinitis    Eustachian tube disorder    Generalized anxiety disorder    Pelvic and perineal pain    Ventricular premature beats    Bipolar disorder    Functional diarrhea    Effusion of right prepatellar bursa    Sinusitis    Multiple thyroid nodules    Attention deficit hyperactivity disorder (ADHD), combined type    Gonorrhea    Bacterial vaginosis    Vaginal candidiasis       Ultrasound Today: Yes  AFP was declined.    ROS -     Patient Denies: leaking of fluid, vaginal bleeding, and more than 6 contractions per hour  Fetal Movement : Yes  All other systems reviewed and are negative.       The additional following portions of the patient's history were reviewed and updated as appropriate: allergies and current medications.      I have reviewed and agree with the HPI, ROS, and historical information as entered above. Perfecto Lindsay MD      /58   Wt 66.7 kg (147 lb)   LMP 2024   BMI 28.71 kg/m²       EXAM:     Prenatal Vitals  BP: 110/58  Weight: 66.7 kg (147 lb)   Fetal Heart Rate: 149          Urine Glucose Read-only: Negative  Urine Protein Read-only: Negative       Assessment and Plan    Problem List Items Addressed This Visit    None  Visit Diagnoses       Prenatal care, subsequent pregnancy, second trimester    -  Primary    Relevant Orders    POC Urinalysis Dipstick (Completed)            Pregnancy at 20w1d  Anatomy scan today is complete and appear within normal limits.  Fetal status reassuring.   Activity and Exercise discussed.  Patient is on Prenatal vitamins  Return in about 4 weeks (around 2024).      Perfecto Lindsay MD  10/22/2024

## 2024-10-29 ENCOUNTER — PATIENT OUTREACH (OUTPATIENT)
Dept: LABOR AND DELIVERY | Facility: HOSPITAL | Age: 29
End: 2024-10-29
Payer: COMMERCIAL

## 2024-10-29 NOTE — OUTREACH NOTE
[facility]  Check-In    Current Estimated Gestational Age: 21w1d      Questions/Answers      Flowsheet Row Responses   Best Method for Contacting Cell   Demographics Reviewed Yes   Currently Employed No   Able to keep appointments as scheduled Yes   Gender(s) and Name(s) Girl   Baby Active/Feeling Fetal Movemen Yes   How are you presently feeling? good   Questions regarding prenatal visits or tests to be ordered? Yes   Resource/Environmental Concerns Financial, Reliable Transportation   Do you have any questions related to your care experience, your pregnancy, plans for delivery, any concerns, etc? No          Feeling well and reports good fetal movement. Denies any concerning symptoms but discussed what to watch out for. Discussed that she still has time for childbirth education if she is interested. Discussed breastfeeding video and outpatient lactation clinic support as well as WIC support.     Currently awaiting placement in a sober living residence locally. She will let [facility] know if she is placed in [place] or [place] and appropriate local resources will be forwarded. Will plan to f/u next week if no contact prior.     Contact information provided. Encouraged to call with questions, concerns, or for support. Will plan f/u around 28 weeks after glucose screening  for updated needs assessment and wellness check in.     Monique Salcido RN  Maternity Nurse Navigator    10/29/2024, 17:09 EDT

## 2024-11-07 ENCOUNTER — PATIENT OUTREACH (OUTPATIENT)
Dept: LABOR AND DELIVERY | Facility: HOSPITAL | Age: 29
End: 2024-11-07
Payer: COMMERCIAL

## 2024-11-07 NOTE — OUTREACH NOTE
[facility]    Contacted Lisa to follow up on her relocation from [facility] to a local sober living house. She is now at [facility] in [place]. Updated pregnancy and community resources were sent via [employer].      Contact info provided, encouraged to call if she has any questions, concerns, or needs assistance with resources.  ANKIT Salcido RN  Maternity Nurse Navigator    11/7/2024, 09:55 EST

## 2024-11-08 ENCOUNTER — OFFICE VISIT (OUTPATIENT)
Dept: FAMILY MEDICINE CLINIC | Facility: CLINIC | Age: 29
End: 2024-11-08
Payer: COMMERCIAL

## 2024-11-08 VITALS
SYSTOLIC BLOOD PRESSURE: 112 MMHG | HEIGHT: 60 IN | HEART RATE: 81 BPM | BODY MASS INDEX: 29.76 KG/M2 | WEIGHT: 151.6 LBS | OXYGEN SATURATION: 98 % | DIASTOLIC BLOOD PRESSURE: 74 MMHG

## 2024-11-08 DIAGNOSIS — T50.905A SUBSTANCE-INDUCED SEIZURE: Primary | ICD-10-CM

## 2024-11-08 DIAGNOSIS — R56.9 SUBSTANCE-INDUCED SEIZURE: Primary | ICD-10-CM

## 2024-11-08 NOTE — PROGRESS NOTES
Silvia Bravo  1995  [id number]  Patient Care Team:  Mason Duarte MD as PCP - General (Internal Medicine)  Arpan Cornejo MD as Consulting Physician (Endocrinology)  Monique Diana, RN as Nurse Navigator (Obstetrics)  Ariana Gregorio, MEKA as Nurse Navigator (Obstetrics)    Silvia Bravo is a 29 y.o. female here today for follow up.     This patient is accompanied by their self who contributes to the history of their care.    Chief Complaint:    Chief Complaint   Patient presents with    forms     Needs forms filled out for drivers licence          History of Present Illness:  I have reviewed and/or updated the patient's past medical, past surgical, family, social history, problem list and allergies as appropriate.     She has a history of seizures in the past while she was actively smoking crack. She had her license was suspended coming out of rehab 1 year ago in July.  She was briefly on Keppra - only while in rehab and  for a week while incarcerated. Denies any childhood sz. She is 64 days clean of everything- last crack use was August 6th.Currently attending AA meetings 2-3 times per week. Working with a temporary sponsor. Her last seizure in 2022 recorded. Typically events will occur would occur after being up for > 3 days and smoking crack.     Review of Systems   Constitutional:  Negative for chills, fatigue, fever, unexpected weight gain and unexpected weight loss.   HENT:  Negative for ear pain, postnasal drip, sinus pressure and sore throat.    Eyes:  Negative for blurred vision, double vision and visual disturbance.   Respiratory:  Negative for cough, shortness of breath and wheezing.    Cardiovascular:  Negative for chest pain, palpitations and leg swelling.   Gastrointestinal:  Negative for abdominal pain, blood in stool, diarrhea, nausea and vomiting.   Endocrine: Negative for cold intolerance, heat intolerance, polydipsia, polyphagia and polyuria.   Genitourinary:  Negative  "for dysuria, flank pain and hematuria.   Musculoskeletal:  Negative for arthralgias and joint swelling.   Skin:  Negative for dry skin and rash.   Neurological:  Negative for weakness, numbness and headache.   Psychiatric/Behavioral:  Negative for self-injury, suicidal ideas and depressed mood.        Vitals:    11/08/24 1536   BP: 112/74   Pulse: 81   SpO2: 98%   Weight: 68.8 kg (151 lb 9.6 oz)   Height: 152.4 cm (60\")     Body mass index is 29.61 kg/m².    Physical Exam  Vitals and nursing note reviewed.   Constitutional:       General: She is not in acute distress.     Appearance: She is well-developed. She is not diaphoretic.   HENT:      Head: Normocephalic and atraumatic.      Right Ear: External ear normal.      Left Ear: External ear normal.      Mouth/Throat:      Pharynx: No oropharyngeal exudate.   Eyes:      General: No scleral icterus.        Right eye: No discharge.      Conjunctiva/sclera: Conjunctivae normal.   Neck:      Thyroid: No thyromegaly.      Vascular: No JVD.      Trachea: No tracheal deviation.   Cardiovascular:      Rate and Rhythm: Normal rate and regular rhythm.      Heart sounds: Normal heart sounds.      Comments: PMI nondisplaced  Pulmonary:      Effort: Pulmonary effort is normal.      Breath sounds: Normal breath sounds. No wheezing or rales.   Abdominal:      General: Bowel sounds are normal.      Palpations: Abdomen is soft.      Tenderness: There is no abdominal tenderness. There is no guarding or rebound.   Musculoskeletal:      Cervical back: Normal range of motion and neck supple.   Lymphadenopathy:      Cervical: No cervical adenopathy.   Skin:     General: Skin is warm and dry.      Capillary Refill: Capillary refill takes less than 2 seconds.      Coloration: Skin is not pale.      Findings: No rash.   Neurological:      Mental Status: She is alert and oriented to person, place, and time.      Motor: No abnormal muscle tone.      Coordination: Coordination normal. "   Psychiatric:         Judgment: Judgment normal.         Procedures    Results Review:    None    Assessment/Plan:    Problem List Items Addressed This Visit       Substance-induced seizure - Primary    Current Assessment & Plan     She remains drug-free in recovery.  She  will be cleared to drive.            Plan of care reviewed with patient at the conclusion of today's visit. Education was provided regarding diagnosis and management.  Patient verbalizes understanding of and agreement with management plan.    No follow-ups on file.    Mason Duarte MD      Please note than portions of this note were completed wt a Voice Recognition Program

## 2024-11-21 ENCOUNTER — ROUTINE PRENATAL (OUTPATIENT)
Dept: OBSTETRICS AND GYNECOLOGY | Facility: CLINIC | Age: 29
End: 2024-11-21
Payer: COMMERCIAL

## 2024-11-21 VITALS — SYSTOLIC BLOOD PRESSURE: 108 MMHG | DIASTOLIC BLOOD PRESSURE: 70 MMHG | WEIGHT: 157 LBS | BODY MASS INDEX: 30.66 KG/M2

## 2024-11-21 DIAGNOSIS — R12 HEARTBURN: Primary | ICD-10-CM

## 2024-11-21 DIAGNOSIS — Z34.82 PRENATAL CARE, SUBSEQUENT PREGNANCY, SECOND TRIMESTER: ICD-10-CM

## 2024-11-21 RX ORDER — LANSOPRAZOLE 30 MG/1
30 CAPSULE, DELAYED RELEASE ORAL DAILY
Qty: 30 CAPSULE | Refills: 3 | Status: SHIPPED | OUTPATIENT
Start: 2024-11-21

## 2024-11-21 NOTE — PROGRESS NOTES
OB FOLLOW UP  CC- Here for care of pregnancy        Silvia Bravo is a 29 y.o.  24w3d patient being seen today for her obstetrical follow up visit. Patient reports hot flashes-states personal hx of thyroid cyst and family hx of thyroid disease, acid reflux-no relief with tums, and requesting RF on PNV.     Her prenatal care is complicated by (and status) : see below.  Patient Active Problem List   Diagnosis    Palpitations    Allergic rhinitis    Eustachian tube disorder    Generalized anxiety disorder    Pelvic and perineal pain    Ventricular premature beats    Bipolar disorder    Functional diarrhea    Effusion of right prepatellar bursa    Sinusitis    Multiple thyroid nodules    Attention deficit hyperactivity disorder (ADHD), combined type    Gonorrhea    Bacterial vaginosis    Vaginal candidiasis    Substance-induced seizure       Ultrasound Today: No  Reviewed 1 hr glucose testing and TDAP next visit.    ROS -   Patient Denies: leaking of fluid, vaginal bleeding, and more than 6 contractions per hour  Fetal Movement : normal  All other systems reviewed and are negative.       The additional following portions of the patient's history were reviewed and updated as appropriate: allergies and current medications.      I have reviewed and agree with the HPI, ROS, and historical information as entered above. Perfecto Lindsay MD      /70   Wt 71.2 kg (157 lb)   LMP 2024   BMI 30.66 kg/m²       EXAM:     Prenatal Vitals  BP: 108/70  Weight: 71.2 kg (157 lb)   Fetal Heart Rate: 153                       Assessment and Plan    Problem List Items Addressed This Visit    None  Visit Diagnoses       Heartburn    -  Primary    Relevant Medications    lansoprazole (Prevacid) 30 MG capsule    Prenatal care, subsequent pregnancy, second trimester        Relevant Orders    Antibody Screen    CBC (No Diff)    Gestational Screen 1 Hr ([facility])    RPR Qualitative with Reflex to Quant             Pregnancy at 24w3d  Fetal status reassuring.  No US indicated today.  1 hour gtt, CBC, Antibody screen, TDAP, and RPR next visit. Instructions given  Reviewed routine prenatal care with the office and educational materials given  Discussed/encouraged TDAP vaccination after 28 weeks  Activity and Exercise discussed.  No follow-ups on file.      Perfecto Lindsay MD  11/21/2024

## 2024-12-19 ENCOUNTER — ROUTINE PRENATAL (OUTPATIENT)
Dept: OBSTETRICS AND GYNECOLOGY | Facility: CLINIC | Age: 29
End: 2024-12-19
Payer: COMMERCIAL

## 2024-12-19 VITALS — SYSTOLIC BLOOD PRESSURE: 118 MMHG | WEIGHT: 163 LBS | DIASTOLIC BLOOD PRESSURE: 62 MMHG | BODY MASS INDEX: 31.83 KG/M2

## 2024-12-19 DIAGNOSIS — O99.891 BACK PAIN AFFECTING PREGNANCY IN SECOND TRIMESTER: ICD-10-CM

## 2024-12-19 DIAGNOSIS — Z34.93 PRENATAL CARE IN THIRD TRIMESTER: Primary | ICD-10-CM

## 2024-12-19 DIAGNOSIS — M54.9 BACK PAIN AFFECTING PREGNANCY IN SECOND TRIMESTER: ICD-10-CM

## 2024-12-19 LAB
GLUCOSE UR STRIP-MCNC: NEGATIVE MG/DL
PROT UR STRIP-MCNC: NEGATIVE MG/DL

## 2024-12-19 RX ORDER — MENTHOL 205.5 MG/1
1 PATCH TOPICAL 3 TIMES DAILY PRN
Qty: 14 PATCH | Refills: 4 | Status: SHIPPED | OUTPATIENT
Start: 2024-12-19

## 2024-12-19 NOTE — PROGRESS NOTES
OB FOLLOW UP  CC- Here for care of pregnancy        Silvia Bravo is a 29 y.o.  28w3d patient being seen today for her obstetrical follow up. Patient reports occasional cramping, heartburn, abdominal itching that is worse at night.      Patient undergoing Glucola testing today. She is due for her testing at 11:30.       MBT: O+  Rhogam: is not indicated.  28 week packet: reviewed with patient , counseled on fetal movement , pediatrician list reviewed, breast pump discussed, and childbirth classes reviewed  TDAP: given today  Flu Status: Declines  Ultrasound Today: No    Her prenatal care is complicated by (and status) : see below.  Patient Active Problem List   Diagnosis    Palpitations    Allergic rhinitis    Eustachian tube disorder    Generalized anxiety disorder    Pelvic and perineal pain    Ventricular premature beats    Bipolar disorder    Functional diarrhea    Effusion of right prepatellar bursa    Sinusitis    Multiple thyroid nodules    Attention deficit hyperactivity disorder (ADHD), combined type    Gonorrhea    Bacterial vaginosis    Vaginal candidiasis    Substance-induced seizure         ROS -   Patient Denies: Loss of Fluid, Vaginal Spotting, Vision Changes, Headaches, Nausea , Vomiting , Contractions, and Epigastric pain  Fetal Movement : normal    The additional following portions of the patient's history were reviewed and updated as appropriate: allergies and current medications.    I have reviewed and agree with the HPI, ROS, and historical information as entered above. Perfecto Lindsay MD  F2    /62   Wt 73.9 kg (163 lb)   LMP 2024   BMI 31.83 kg/m²         EXAM:     Prenatal Vitals  BP: 118/62  Weight: 73.9 kg (163 lb)   Fetal Heart Rate: 145               Urine Glucose Read-only: Negative  Urine Protein Read-only: Negative         Assessment and Plan    Problem List Items Addressed This Visit    None  Visit Diagnoses       Prenatal care in third trimester    -   Primary    Relevant Medications    Menthol, Topical Analgesic, (Icy Hot) 5 % patch    Other Relevant Orders    POC Urinalysis Dipstick (Completed)    CBC (No Diff)    Gestational Screen 1 Hr ([facility])    Antibody Screen    RPR, Rfx Qn RPR / Confirm TP    Tdap Vaccine => 6yo IM (BOOSTRIX/ADACEL) (Completed)    US Ob Follow Up Transabdominal Approach    Back pain affecting pregnancy in second trimester                Pregnancy at 28w3d  1 hr Glucola, CBC, RPR. Antibody screen and TDAP today  Fetal movement/PTL or Labor precautions  Medication(s) Ordered  U/S ordered at follow up  Patient is on Prenatal vitamins  Will try Icy hot   Activity and Exercise discussed.  Return in about 1 month (around 1/19/2025) for us then .        Perfecto Lindsay MD  12/19/2024

## 2024-12-20 ENCOUNTER — TELEPHONE (OUTPATIENT)
Dept: OBSTETRICS AND GYNECOLOGY | Facility: CLINIC | Age: 29
End: 2024-12-20
Payer: COMMERCIAL

## 2024-12-20 LAB
BLD GP AB SCN SERPL QL: NEGATIVE
ERYTHROCYTE [DISTWIDTH] IN BLOOD BY AUTOMATED COUNT: 12.8 % (ref 12.3–15.4)
GLUCOSE 1H P 50 G GLC PO SERPL-MCNC: 113 MG/DL (ref 65–139)
HCT VFR BLD AUTO: 33.4 % (ref 34–46.6)
HGB BLD-MCNC: 11.1 G/DL (ref 12–15.9)
MCH RBC QN AUTO: 28.9 PG (ref 26.6–33)
MCHC RBC AUTO-ENTMCNC: 33.2 G/DL (ref 31.5–35.7)
MCV RBC AUTO: 87 FL (ref 79–97)
PLATELET # BLD AUTO: 270 10*3/MM3 (ref 140–450)
RBC # BLD AUTO: 3.84 10*6/MM3 (ref 3.77–5.28)
RPR SER QL: NON REACTIVE
WBC # BLD AUTO: 13.16 10*3/MM3 (ref 3.4–10.8)

## 2024-12-23 ENCOUNTER — PATIENT OUTREACH (OUTPATIENT)
Dept: LABOR AND DELIVERY | Facility: HOSPITAL | Age: 29
End: 2024-12-23
Payer: COMMERCIAL

## 2024-12-23 NOTE — OUTREACH NOTE
"[facility]  Check-In    Current Estimated Gestational Age: 29w0d      Questions/Answers      Flowsheet Row Responses   Best Method for Contacting Cell   Demographics Reviewed No   Currently Employed Yes   Able to keep appointments as scheduled Yes   Gender(s) and Name(s) Baby Girl-Cara   Baby Active/Feeling Fetal Movemen Yes   How are you presently feeling? \"I'm feeling good\"   Are you having any of the following symptoms? Pain, Other, Epigastric Pain   Other Symptoms: C/O reflux, back/hip pain and tired all the time.   Questions regarding prenatal visits or tests to be ordered? Yes   Questions Discussed new RX for reflux meds and iron supplements.   New Diagnosis Anemia   Education related to new diagnoses/home equipment Yes   Additional Info sent via Patient Instructions   May I ask you questions about your substance use? Yes   Supplies ready for baby Clothing, Crib, Diapers   Resource/Environmental Concerns Financial, Reliable Transportation   Do you have any questions related to your care experience, your pregnancy, plans for delivery, any concerns, etc? No   Other Education HANDS, Insurance benefits/Incentives, How to find a pediatrician          Feeling well and reports good fetal movement. Denies any concerning symptoms but discussed what to watch out for. Discussed that she still has time for childbirth education if she is interested. Discussed breastfeeding video and outpatient lactation clinic support as well as WIC support. She has begun gathering items she will need for baby.    Updated resources provided via [employer] message. Contact information provided. Encouraged to call with questions, concerns, or for support. Will plan f/u around 35 weeks to ensure she has everything she needs in place and feels ready for delivery.    Monique Salcido RN  Maternity Nurse Navigator    12/23/2024, 17:45 EST          "

## 2024-12-25 ENCOUNTER — HOSPITAL ENCOUNTER (OUTPATIENT)
Facility: HOSPITAL | Age: 29
Discharge: HOME | End: 2024-12-25
Attending: OBSTETRICS & GYNECOLOGY | Admitting: OBSTETRICS & GYNECOLOGY
Payer: COMMERCIAL

## 2024-12-25 VITALS — TEMPERATURE: 98.3 F | SYSTOLIC BLOOD PRESSURE: 111 MMHG | DIASTOLIC BLOOD PRESSURE: 63 MMHG

## 2024-12-25 LAB
AMPHET+METHAMPHET UR QL: NEGATIVE
AMPHETAMINES UR QL: NEGATIVE
BACTERIA UR QL AUTO: ABNORMAL /HPF
BARBITURATES UR QL SCN: NEGATIVE
BENZODIAZ UR QL SCN: NEGATIVE
BILIRUB UR QL STRIP: NEGATIVE
BUPRENORPHINE SERPL-MCNC: NEGATIVE NG/ML
CANNABINOIDS SERPL QL: NEGATIVE
CLARITY UR: ABNORMAL
COCAINE UR QL: NEGATIVE
COLOR UR: YELLOW
FENTANYL UR-MCNC: NEGATIVE NG/ML
GLUCOSE UR STRIP-MCNC: NEGATIVE MG/DL
HGB UR QL STRIP.AUTO: NEGATIVE
HYALINE CASTS UR QL AUTO: ABNORMAL /LPF
KETONES UR QL STRIP: NEGATIVE
LEUKOCYTE ESTERASE UR QL STRIP.AUTO: ABNORMAL
METHADONE UR QL SCN: NEGATIVE
NITRITE UR QL STRIP: NEGATIVE
OPIATES UR QL: NEGATIVE
OXYCODONE UR QL SCN: NEGATIVE
PCP UR QL SCN: NEGATIVE
PH UR STRIP.AUTO: 6.5 [PH] (ref 5–8)
PROT UR QL STRIP: NEGATIVE
RBC # UR STRIP: ABNORMAL /HPF
REF LAB TEST METHOD: ABNORMAL
SP GR UR STRIP: 1.01 (ref 1–1.03)
SQUAMOUS #/AREA URNS HPF: ABNORMAL /HPF
TRANS CELLS #/AREA URNS HPF: ABNORMAL /HPF
TRICYCLICS UR QL SCN: NEGATIVE
UROBILINOGEN UR QL STRIP: ABNORMAL
WBC # UR STRIP: ABNORMAL /HPF

## 2024-12-25 PROCEDURE — G0463 HOSPITAL OUTPT CLINIC VISIT: HCPCS

## 2024-12-25 PROCEDURE — 80307 DRUG TEST PRSMV CHEM ANLYZR: CPT | Performed by: OBSTETRICS & GYNECOLOGY

## 2024-12-25 PROCEDURE — 81001 URINALYSIS AUTO W/SCOPE: CPT | Performed by: OBSTETRICS & GYNECOLOGY

## 2024-12-25 PROCEDURE — 87086 URINE CULTURE/COLONY COUNT: CPT | Performed by: OBSTETRICS & GYNECOLOGY

## 2024-12-25 RX ORDER — FERROUS SULFATE 324(65)MG
324 TABLET, DELAYED RELEASE (ENTERIC COATED) ORAL
COMMUNITY
End: 2024-12-27
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
## 2024-12-26 LAB — BACTERIA SPEC AEROBE CULT: NORMAL

## 2024-12-27 ENCOUNTER — TELEPHONE (OUTPATIENT)
Dept: OBSTETRICS AND GYNECOLOGY | Facility: CLINIC | Age: 29
End: 2024-12-27
Payer: COMMERCIAL

## 2024-12-27 DIAGNOSIS — O99.019 IRON DEFICIENCY ANEMIA DURING PREGNANCY: Primary | ICD-10-CM

## 2024-12-27 DIAGNOSIS — D50.9 IRON DEFICIENCY ANEMIA DURING PREGNANCY: Primary | ICD-10-CM

## 2024-12-27 RX ORDER — FERROUS SULFATE 324(65)MG
324 TABLET, DELAYED RELEASE (ENTERIC COATED) ORAL
Qty: 30 TABLET | Refills: 3 | Status: SHIPPED | OUTPATIENT
Start: 2024-12-27

## 2024-12-27 NOTE — TELEPHONE ENCOUNTER
PT is calling to see if she can get a script for iron pills due to her seeing her lab results online stating her iron is low.

## 2024-12-28 ENCOUNTER — PATIENT ROUNDING (BHMG ONLY) (OUTPATIENT)
Dept: URGENT CARE | Facility: CLINIC | Age: 29
End: 2024-12-28
Payer: COMMERCIAL

## 2024-12-31 ENCOUNTER — PATIENT ROUNDING (BHMG ONLY) (OUTPATIENT)
Dept: URGENT CARE | Facility: CLINIC | Age: 29
End: 2024-12-31
Payer: COMMERCIAL

## 2025-01-17 ENCOUNTER — ROUTINE PRENATAL (OUTPATIENT)
Dept: OBSTETRICS AND GYNECOLOGY | Facility: CLINIC | Age: 30
End: 2025-01-17
Payer: COMMERCIAL

## 2025-01-17 VITALS — WEIGHT: 164.8 LBS | SYSTOLIC BLOOD PRESSURE: 108 MMHG | DIASTOLIC BLOOD PRESSURE: 62 MMHG | BODY MASS INDEX: 31.14 KG/M2

## 2025-01-17 DIAGNOSIS — Z34.93 PRENATAL CARE IN THIRD TRIMESTER: Primary | ICD-10-CM

## 2025-01-17 DIAGNOSIS — R12 HEARTBURN: ICD-10-CM

## 2025-01-17 LAB
GLUCOSE UR STRIP-MCNC: NEGATIVE MG/DL
PROT UR STRIP-MCNC: NEGATIVE MG/DL

## 2025-01-17 RX ORDER — LANSOPRAZOLE 30 MG/1
30 CAPSULE, DELAYED RELEASE ORAL DAILY
Qty: 30 CAPSULE | Refills: 1 | Status: SHIPPED | OUTPATIENT
Start: 2025-01-17

## 2025-01-17 RX ORDER — CHOLECALCIFEROL (VITAMIN D3) 50 MCG
1 TABLET ORAL DAILY
Qty: 30 EACH | Refills: 2 | Status: SHIPPED | OUTPATIENT
Start: 2025-01-17

## 2025-01-17 NOTE — PROGRESS NOTES
OB FOLLOW UP  CC- Here for care of pregnancy        Silvia Bravo is a 29 y.o.  32w4d patient being seen today for her obstetrical follow up visit. Patient reports vision changes accompanied by lightheadedness when getting out of bed, BH contractions, and pelvic cramping. Patient also reports right hip and back pain when laying on her left side.     Her prenatal care is complicated by (and status) :  see below.  Patient Active Problem List   Diagnosis    Palpitations    Allergic rhinitis    Eustachian tube disorder    Generalized anxiety disorder    Pelvic and perineal pain    Ventricular premature beats    Bipolar disorder    Functional diarrhea    Effusion of right prepatellar bursa    Sinusitis    Multiple thyroid nodules    Attention deficit hyperactivity disorder (ADHD), combined type    Gonorrhea    Bacterial vaginosis    Vaginal candidiasis    Substance-induced seizure       Flu Status: Declines  TDAP status:  Already given  RSV vaccine:  Given today  Rhogam status: Was not indicated  28 week labs: Reviewed and Labs show anemia. She is taking additional iron supplement.  Ultrasound Today: Yes. FHR: 135bpm. Cephalic. JASWANT: 11.2Cm. HC: 62%. AC: 40%. EFW: 48%- 4lb 8oz.  Non Stress Test: No.      ROS -   Patient Denies: Loss of Fluid, Vaginal Spotting, Nausea , Vomiting , and skin itching  Fetal Movement : normal  All other systems reviewed and are negative.       The additional following portions of the patient's history were reviewed and updated as appropriate: allergies and current medications.    I have reviewed and agree with the HPI, ROS, and historical information as entered above. Perfecto Lindsay MD      /62   Wt 74.8 kg (164 lb 12.8 oz)   LMP 2024   BMI 31.14 kg/m²         EXAM:     Prenatal Vitals  BP: 108/62  Weight: 74.8 kg (164 lb 12.8 oz)   Fetal Heart Rate: 135/US               Urine Glucose Read-only: Negative  Urine Protein Read-only: Negative           Assessment  and Plan    Problem List Items Addressed This Visit    None  Visit Diagnoses       Prenatal care in third trimester    -  Primary    Relevant Medications    Prenatal MV-Min-Fe Fum-FA-DHA (Prenatal Multivitamin + DHA) 28-0.8 & 200 MG misc    lansoprazole (Prevacid) 30 MG capsule    Other Relevant Orders    POC Urinalysis Dipstick (Completed)    ABRYSVO RSV Vaccine (Adults 60+, pregnant women 32-36 wks) (Completed)    Heartburn        Relevant Medications    lansoprazole (Prevacid) 30 MG capsule        US wasa nl     Pregnancy at 32w4d  Fetal status reassuring.  28 week labs reviewed.    Activity and Exercise discussed.  Fetal movement/PTL or Labor precautions  Patient is on Prenatal vitamins  Return in about 2 weeks (around 1/31/2025).    Perfecto Lindsay MD  01/17/2025

## 2025-01-30 ENCOUNTER — ROUTINE PRENATAL (OUTPATIENT)
Dept: OBSTETRICS AND GYNECOLOGY | Facility: CLINIC | Age: 30
End: 2025-01-30
Payer: COMMERCIAL

## 2025-01-30 VITALS — DIASTOLIC BLOOD PRESSURE: 70 MMHG | WEIGHT: 166.4 LBS | SYSTOLIC BLOOD PRESSURE: 110 MMHG | BODY MASS INDEX: 31.44 KG/M2

## 2025-01-30 DIAGNOSIS — Z34.93 PRENATAL CARE IN THIRD TRIMESTER: Primary | ICD-10-CM

## 2025-01-30 DIAGNOSIS — O26.43 HERPES GESTATIONIS IN THIRD TRIMESTER: ICD-10-CM

## 2025-01-30 LAB
GLUCOSE UR STRIP-MCNC: NEGATIVE MG/DL
PROT UR STRIP-MCNC: NEGATIVE MG/DL

## 2025-01-30 RX ORDER — VALACYCLOVIR HYDROCHLORIDE 500 MG/1
500 TABLET, FILM COATED ORAL 2 TIMES DAILY
Qty: 60 TABLET | Refills: 3 | Status: SHIPPED | OUTPATIENT
Start: 2025-01-30

## 2025-01-30 NOTE — PROGRESS NOTES
OB FOLLOW UP  CC- Here for care of pregnancy        Silvia Bravo is a 29 y.o.  34w3d patient being seen today for her obstetrical follow up visit. Patient reports no complaints. Patient reports BH ctx. Patient states that her heartburn has been worse even with Tums and Prevacid. Patient states that fetal movements have been a little different but she feels 10 movements within 2 hours while doing kick counts    Her prenatal care is complicated by (and status) :    Patient Active Problem List   Diagnosis    Palpitations    Allergic rhinitis    Eustachian tube disorder    Generalized anxiety disorder    Pelvic and perineal pain    Ventricular premature beats    Bipolar disorder    Functional diarrhea    Effusion of right prepatellar bursa    Sinusitis    Multiple thyroid nodules    Attention deficit hyperactivity disorder (ADHD), combined type    Gonorrhea    Bacterial vaginosis    Vaginal candidiasis    Substance-induced seizure       Flu Status: Declines  TDAP status:  2024  RSV vaccine: already given  Rhogam status: was not indicated  Ultrasound Today: No  Non Stress Test: No.      ROS -   Patient Denies: Loss of Fluid, Vaginal Spotting, Vision Changes, Headaches, Nausea , Vomiting , Epigastric pain, and skin itching  Fetal Movement : normal  Other than what is documented in the HPI, all other systems reviewed and are negative.     The additional following portions of the patient's history were reviewed and updated as appropriate: allergies and current medications.    I have reviewed and agree with the HPI, ROS, and historical information as entered above. Perfecto Lindsay MD      /70   Wt 75.5 kg (166 lb 6.4 oz)   LMP 2024   BMI 31.44 kg/m²         EXAM:     Prenatal Vitals  BP: 110/70  Weight: 75.5 kg (166 lb 6.4 oz)                   Urine Glucose Read-only: Negative  Urine Protein Read-only: Negative           Assessment and Plan    Problem List Items Addressed This Visit     None  Visit Diagnoses       Prenatal care in third trimester    -  Primary    Relevant Medications    valACYclovir (Valtrex) 500 MG tablet    Other Relevant Orders    POC Urinalysis Dipstick (Completed)    Herpes gestationis in third trimester        Relevant Medications    valACYclovir (Valtrex) 500 MG tablet            Pregnancy at 34w3d  Fetal status reassuring.  28 week labs reviewed.    Activity and Exercise discussed.  Fetal movement/PTL or Labor precautions  Patient is on Prenatal vitamins  No follow-ups on file.    Perfecto Lindsay MD  01/30/2025

## 2025-02-05 ENCOUNTER — PATIENT OUTREACH (OUTPATIENT)
Dept: LABOR AND DELIVERY | Facility: HOSPITAL | Age: 30
End: 2025-02-05
Payer: COMMERCIAL

## 2025-02-05 NOTE — OUTREACH NOTE
[facility]    Called Lisa for 35 wk check in and she requested a call back tomorrow between 1-2 pm.     Monique Salcido RN  Maternity Nurse Navigator    2/5/2025, 17:14 EST

## 2025-02-06 ENCOUNTER — PATIENT OUTREACH (OUTPATIENT)
Dept: LABOR AND DELIVERY | Facility: HOSPITAL | Age: 30
End: 2025-02-06
Payer: COMMERCIAL

## 2025-02-06 NOTE — OUTREACH NOTE
[facility]  Unable to Reach    Questions/Answers      Flowsheet Row Responses   Pending Outreach Prenatal Check-in   Call Attempt Second   Outcome No answer/busy, Left message   Next Call Attempt Date 02/13/25   Unable to reach comments: LVM asking for return call. Will follow up at next OB visit if no response prior.            Monique Salcido RN  Maternity Nurse Navigator    2/6/2025, 13:14 EST

## 2025-02-13 ENCOUNTER — LAB (OUTPATIENT)
Dept: LAB | Facility: HOSPITAL | Age: 30
End: 2025-02-13
Payer: COMMERCIAL

## 2025-02-13 ENCOUNTER — ROUTINE PRENATAL (OUTPATIENT)
Dept: OBSTETRICS AND GYNECOLOGY | Facility: CLINIC | Age: 30
End: 2025-02-13
Payer: COMMERCIAL

## 2025-02-13 ENCOUNTER — PATIENT OUTREACH (OUTPATIENT)
Dept: LABOR AND DELIVERY | Facility: HOSPITAL | Age: 30
End: 2025-02-13
Payer: COMMERCIAL

## 2025-02-13 VITALS — BODY MASS INDEX: 32.69 KG/M2 | WEIGHT: 173 LBS | DIASTOLIC BLOOD PRESSURE: 64 MMHG | SYSTOLIC BLOOD PRESSURE: 104 MMHG

## 2025-02-13 DIAGNOSIS — Z34.93 PRENATAL CARE, THIRD TRIMESTER: Primary | ICD-10-CM

## 2025-02-13 DIAGNOSIS — Z3A.36 36 WEEKS GESTATION OF PREGNANCY: ICD-10-CM

## 2025-02-13 DIAGNOSIS — Z34.93 PRENATAL CARE, THIRD TRIMESTER: ICD-10-CM

## 2025-02-13 PROCEDURE — 87081 CULTURE SCREEN ONLY: CPT

## 2025-02-13 NOTE — OUTREACH NOTE
"[facility]  Check-In    Current Estimated Gestational Age: 36w3d      Questions/Answers      Flowsheet Row Responses   Best Method for Contacting Cell   Demographics Reviewed No   Currently Employed Yes   Able to keep appointments as scheduled Yes   Gender(s) and Name(s) Cara   Baby Active/Feeling Fetal Movemen Yes   How are you presently feeling? \"I'm ready for this to be over\"   Are you having any of the following symptoms? Contractions   Questions regarding prenatal visits or tests to be ordered? No   New Diagnosis Anemia   Education related to new diagnoses/home equipment No   May I ask you questions about your substance use? Yes   Supplies ready for baby Breast Pump, Car Seat, Clothing, Crib, Diapers, Feeding Supplies   Resource/Environmental Concerns Financial   Do you have any questions related to your care experience, your pregnancy, plans for delivery, any concerns, etc? Yes   Question Discussed paternity affidavit and birth certificate.   Other Education HANDS, Insurance benefits/Incentives, Meds to Beds, How to find a pediatrician          Feeling well and reports good fetal movement. Discussed what to expect when she goes in to L&D including admission process, induction process, comfort measures, positioning, pain medication/Fentanyl, epidural placement, delivery, skin to skin, breastfeeding, post-delivery, and MB care. VU.    She is feeling ready for delivery. She has enrolled in [facility] and feels like she has everything she will need for baby.    Updated resources provided via [employer] message. Contact information provided. Encouraged to call with questions, concerns, or for support. Will plan to see inpatient after delivery.    Monique Salcido RN  Maternity Nurse Navigator    2/13/2025, 11:52 EST          "

## 2025-02-13 NOTE — PROGRESS NOTES
OB FOLLOW UP  CC- Here for care of pregnancy        Silvia Bravo is a 29 y.o.  36w3d patient being seen today for her obstetrical follow up visit. Patient reports right hip discomfort, yellowish/white discharge (present 2 days, denies odor; itching last week relieved with vagisil), daily Jerome Smiley, intermittent contractions, nightly headaches (Tylenol helps alleviate; denies vision changes), trace BLE edema, nausea/vomiting (emeses 3x in the past week), daily heartburn/reflux (Tums and prevacid help), improved constipation, and itching at the thumbs.    Would like to discuss C/S.    Her prenatal care is complicated by (and status): see below.  Patient Active Problem List   Diagnosis    Palpitations    Allergic rhinitis    Eustachian tube disorder    Generalized anxiety disorder    Pelvic and perineal pain    Ventricular premature beats    Bipolar disorder    Functional diarrhea    Effusion of right prepatellar bursa    Sinusitis    Multiple thyroid nodules    Attention deficit hyperactivity disorder (ADHD), combined type    Gonorrhea    Bacterial vaginosis    Vaginal candidiasis    Substance-induced seizure       GBS Status: Done Today. She is not allergic to PCN.    Allergies   Allergen Reactions    Mushroom Anaphylaxis    Latex Rash          Flu Status: Declines  Her Delivery Plan is:  IOL scheduled 3/6/25      today: no  Non Stress Test: No.    ROS -   Patient Denies: Loss of Fluid, Vaginal Spotting, and Vision Changes  Fetal Movement: normal  Other than what is documented in the HPI, all other systems reviewed and are negative.       The additional following portions of the patient's history were reviewed and updated as appropriate: allergies, current medications, past family history, past medical history, past social history, past surgical history, and problem list.    I have reviewed and agree with the HPI, ROS, and historical information as entered above. Perfecto Lindsay MD        EXAM:      Prenatal Vitals  BP: 104/64  Weight: 78.5 kg (173 lb)   Fetal Heart Rate: 125                          Assessment and Plan    Problem List Items Addressed This Visit    None  Visit Diagnoses       Prenatal care, third trimester    -  Primary    Relevant Orders    POC Glucose, Urine, Qualitative, Dipstick    POC Protein, Urine, Qualitative, Dipstick    Group B Streptococcus Culture - Swab, Vaginal/Rectum    36 weeks gestation of pregnancy        Relevant Orders    POC Glucose, Urine, Qualitative, Dipstick    POC Protein, Urine, Qualitative, Dipstick    Group B Streptococcus Culture - Swab, Vaginal/Rectum            Pregnancy at 36w3d  Fetal status reassuring.   Reviewed Pre-eclampsia signs/symptoms  Reviewed upcoming IOL with patient. Instructions given.  Delivery options reviewed with patient  Signs of labor reviewed  Kick counts reviewed  Activity and Exercise discussed.  Return in about 1 week (around 2/20/2025).    Perfecto Lindsay MD  02/13/2025

## 2025-02-15 ENCOUNTER — HOSPITAL ENCOUNTER (OUTPATIENT)
Facility: HOSPITAL | Age: 30
Setting detail: OBSERVATION
Discharge: HOME | End: 2025-02-15
Attending: OBSTETRICS & GYNECOLOGY | Admitting: OBSTETRICS & GYNECOLOGY
Payer: COMMERCIAL

## 2025-02-15 VITALS
BODY MASS INDEX: 32.66 KG/M2 | OXYGEN SATURATION: 95 % | HEART RATE: 87 BPM | DIASTOLIC BLOOD PRESSURE: 64 MMHG | WEIGHT: 173 LBS | SYSTOLIC BLOOD PRESSURE: 113 MMHG | TEMPERATURE: 98.5 F | HEIGHT: 61 IN

## 2025-02-15 PROBLEM — Z34.93 THIRD TRIMESTER PREGNANCY: Status: ACTIVE | Noted: 2025-02-15

## 2025-02-15 LAB
BACTERIA UR QL AUTO: ABNORMAL /HPF
BILIRUB UR QL STRIP: NEGATIVE
CLARITY UR: ABNORMAL
COLOR UR: YELLOW
EXPIRATION DATE: ABNORMAL
GLUCOSE UR STRIP-MCNC: NEGATIVE MG/DL
HGB UR QL STRIP.AUTO: NEGATIVE
HYALINE CASTS UR QL AUTO: ABNORMAL /LPF
KETONES UR QL STRIP: NEGATIVE
LEUKOCYTE ESTERASE UR QL STRIP.AUTO: ABNORMAL
Lab: ABNORMAL
NITRITE UR QL STRIP: NEGATIVE
PH UR STRIP.AUTO: 7 [PH] (ref 5–8)
PROT UR QL STRIP: ABNORMAL
PROT UR STRIP-MCNC: ABNORMAL MG/DL
RBC # UR STRIP: ABNORMAL /HPF
REF LAB TEST METHOD: ABNORMAL
SP GR UR STRIP: 1.02 (ref 1–1.03)
SQUAMOUS #/AREA URNS HPF: ABNORMAL /HPF
UROBILINOGEN UR QL STRIP: ABNORMAL
WBC # UR STRIP: ABNORMAL /HPF

## 2025-02-15 PROCEDURE — G0463 HOSPITAL OUTPT CLINIC VISIT: HCPCS

## 2025-02-15 PROCEDURE — G0378 HOSPITAL OBSERVATION PER HR: HCPCS

## 2025-02-15 PROCEDURE — 81001 URINALYSIS AUTO W/SCOPE: CPT | Performed by: OBSTETRICS & GYNECOLOGY

## 2025-02-15 PROCEDURE — 81002 URINALYSIS NONAUTO W/O SCOPE: CPT | Performed by: OBSTETRICS & GYNECOLOGY

## 2025-02-15 PROCEDURE — 59025 FETAL NON-STRESS TEST: CPT

## 2025-02-15 RX ORDER — CALCIUM CARBONATE 500 MG/1
1 TABLET, CHEWABLE ORAL DAILY
COMMUNITY

## 2025-02-15 NOTE — H&P
"[facility]  Obstetric History and Physical    Referring Provider: Perfecto Lindsay MD      Chief Complaint   Patient presents with    MULTIPLE COMPLAINTS       Subjective     Patient is a 29 y.o. female  currently at 36w5d, who presents with dizziness.  Patient states she was at work this morning on the toilet and felt dizzy, tunnel vision, and nauseated.  Patient states the symptoms slowly subsided.  Patient denies falling, leaking of fluid, vaginal bleeding, urinary symptoms, fever, environmental or infectious exposures .  She reports normal fetal activity.    The following portions of the patients history were reviewed and updated as appropriate: current medications, allergies, past medical history, past surgical history, past family history, past social history, and problem list .       Prenatal Information:   Maternal Prenatal Labs  Blood Type No results found for: \"ABO\"   Rh Status No results found for: \"RH\"   Antibody Screen No results found for: \"ABSCRN\"   Gonnorhea No results found for: \"GCCX\"   Chlamydia No results found for: \"CLAMYDCU\"   RPR No results found for: \"RPR\"   Syphilis Antibody No results found for: \"SYPHILIS\"   Rubella No results found for: \"RUBELLAIGGIN\"   Hepatitis B Surface Antigen No results found for: \"HEPBSAG\"   HIV-1 Antibody No results found for: \"LABHIV1\"   Hepatitis C Antibody No results found for: \"HEPCAB\"   Rapid Urin Drug Screen No results found for: \"AMPMETHU\", \"BARBITSCNUR\", \"LABBENZSCN\", \"LABMETHSCN\", \"LABOPIASCN\", \"THCURSCR\", \"COCAINEUR\", \"AMPHETSCREEN\", \"PROPOXSCN\", \"BUPRENORSCNU\", \"METAMPSCNUR\", \"OXYCODONESCN\", \"TRICYCLICSCN\"   Group B Strep Culture No results found for: \"GBSANTIGEN\"           External Prenatal Results       Pregnancy Outside Results - Transcribed From Office Records - See Scanned Records For Details       Test Value Date Time    ABO  O  08/15/24 1407    Rh  Positive  08/15/24 1407    Antibody Screen  Negative  24 1218       Negative  08/15/24 " 1407    Varicella IgG       Rubella  3.89 index 08/15/24 1407    Hgb  11.1 g/dL 12/19/24 1218       12.2 g/dL 08/15/24 1407    Hct  33.4 % 12/19/24 1218       40.2 % 08/15/24 1407    HgB A1c        1h GTT  113 mg/dL 12/19/24 1218    3h GTT Fasting       3h GTT 1 hour       3h GTT 2 hour       3h GTT 3 hour        Gonorrhea (discrete)       Chlamydia (discrete)       RPR  Non Reactive  12/19/24 1218       Non Reactive  08/15/24 1407    Syphils cascade: TP-Ab (FTA)       TP-Ab       TP-Ab (EIA)       TPPA       HBsAg  Negative  08/15/24 1407    Herpes Simplex Virus PCR       Herpes Simplex VIrus Culture       HIV  Non Reactive  08/15/24 1407    Hep C RNA Quant PCR       Hep C Antibody  Non Reactive  08/15/24 1407    AFP       NIPT       Cystic Fibrosis (Jose R)       Cystic Fibroisis        Spinal Muscular atrophy       Fragile X       Group B Strep  No Group B Streptococcus isolated  02/13/25 1844    GBS Susceptibility to Clindamycin       GBS Susceptibility to Erythromycin       Fetal Fibronectin       Genetic Testing, Maternal Blood                 Drug Screening       Test Value Date Time    Urine Drug Screen       Amphetamine Screen  Negative  12/25/24 0411       Negative ng/mL 08/15/24 1407    Barbiturate Screen  Negative  12/25/24 0411       Negative ng/mL 08/15/24 1407    Benzodiazepine Screen  Negative  12/25/24 0411       Negative ng/mL 08/15/24 1407    Methadone Screen  Negative  12/25/24 0411       Negative ng/mL 08/15/24 1407    Phencyclidine Screen  Negative  12/25/24 0411       Negative ng/mL 08/15/24 1407    Opiates Screen  Negative  12/25/24 0411    THC Screen  Negative  12/25/24 0411    Cocaine Screen       Propoxyphene Screen  Negative ng/mL 08/15/24 1407    Buprenorphine Screen  Negative  12/25/24 0411    Methamphetamine Screen       Oxycodone Screen  Negative  12/25/24 0411    Tricyclic Antidepressants Screen  Negative  12/25/24 0411              Legend    ^: Historical                          "     Past OB History:       OB History    Para Term  AB Living   4 1 0 1 2 1   SAB IAB Ectopic Molar Multiple Live Births   0 2 0 0 0 1      # Outcome Date GA Lbr Srinivasa/2nd Weight Sex Type Anes PTL Lv   4 Current            3 IAB 22           2 IAB 20           1  17 36w0d  2892 g (6 lb 6 oz) M Vag-Spont  N NOEMY       Past Medical History: Past Medical History:   Diagnosis Date    ADHD     Anxiety     Bipolar disorder     Depression     GERD (gastroesophageal reflux disease)     History of drug overdose 2024    cocaine and xanax    History of palpitations     History of syphilis 2023    History of thyroid cyst 2022    \"She has tiny thyroid cysts. These are too small to cause symptoms.\"    Kidney infection     Kidney stones     Ventricular premature beats       Past Surgical History Past Surgical History:   Procedure Laterality Date    ADENOIDECTOMY      CHOLECYSTECTOMY      TONSILLECTOMY      WISDOM TOOTH EXTRACTION        Family History: Family History   Problem Relation Age of Onset    Sleep apnea Father     No Known Problems Brother     No Known Problems Sister     No Known Problems Paternal Grandfather     No Known Problems Paternal Grandmother     Atrial fibrillation Maternal Grandmother     Skin cancer Maternal Grandmother     Diabetes Maternal Grandfather     Hypertension Maternal Grandfather     Stroke Maternal Grandfather     Heart attack Maternal Grandfather     Ovarian cancer Maternal Aunt     Breast cancer Maternal Aunt     Uterine cancer Neg Hx     Colon cancer Neg Hx       Social History:  reports that she has been smoking cigarettes. She has a 2.5 pack-year smoking history. She has never used smokeless tobacco.   reports current alcohol use.   reports current drug use. Drugs: Marijuana and Cocaine(coke).                   General ROS Negative Findings:Headaches, Visual Changes, Epigastric pain, Anorexia, ROM, and Vaginal Bleeding    ROS     All other " systems have been reviewed and are neg  Objective       Vital Signs Range for the last 24 hours  Temperature: Temp:  [98.5 °F (36.9 °C)] 98.5 °F (36.9 °C)   Temp Source: Temp src: Oral   BP:     Pulse:     Respirations:     SPO2:     O2 Amount (l/min):     O2 Devices     Weight: Weight:  [78.5 kg (173 lb)] 78.5 kg (173 lb)     Physical Examination:   General:   alert, appears stated age, and cooperative   Skin:   normal   HEENT:     Lungs:      Heart:      Gastrointestinal: Soft, gravid uterus, guarding benign exam   Lower Extremities: No edema, no calf tenderness   :    Musculoskeletal:     Neuro:           Presentation: vtx   Cervix: Exam by: Method: sterile vaginal exam performed (Dr. Zuleta)   Dilation:  FT   Effacement:  50   Station:  NE  No blood noted on glove.       Fetal Heart Rate Assessment   Method: Fetal HR Assessment Method: external   Beats/min: Fetal HR (beats/min): 125   Baseline: Fetal HR Baseline: normal range   Varibility: Fetal HR Variability: moderate (amplitude range 6 to 25 bpm)   Accels: Fetal HR Accelerations: greater than/equal to 15 bpm   Decels: Fetal HR Decelerations: absent   Tracing Category:     C-indications dizziness, interpretation reactive, moderate variability, accelerations present 15 x 15, no fetal deceleration noted, onset 40, end time 1320, irregular contractions noted.  Uterine Assessment   Method: Method: external tocotransducer   Frequency (min): Contraction Frequency (Minutes): 2-5   Ctx Count in 10 min:     Duration:     Intensity:     Intensity by IUPC:     Resting Tone:     Resting Tone by IUPC:     Pierron Units:       Laboratory Results:   Lab Results (last 24 hours)       Procedure Component Value Units Date/Time    POC Protein, Urine, Qualitative, Dipstick [[id number]]  (Abnormal) Collected: 02/15/25 1332    Specimen: Urine Updated: 02/15/25 1337     Protein, POC Trace mg/dL      Lot Number [id number]     Expiration Date 03/31/2025          Radiology Review:    Imaging Results (Last 24 Hours)       ** No results found for the last 24 hours. **          Other Studies:    Assessment & Plan       * No active hospital problems. *        Assessment:  1.  Intrauterine pregnancy at 36w5d gestation with reactive fetal status.    2.  Presyncopal episode (vasovagal)  3.  Maternal fetal wellbeing established  4.      Plan:  1.  Discharge to home, kick count, instructed patient  on proper nutrition, hydration, activity.  Patient to take stool softener/MiraLAX to have regular bowel movements.  Follow-up with OB provider routine or as needed  2. Plan of care has been reviewed with patient.  3.  Risks, benefits of treatment plan have been discussed.  4.  All questions have been answered.  5      Donis Zuleta, DO  2/15/2025  13:52 EST

## 2025-02-16 LAB — BACTERIA SPEC AEROBE CULT: NORMAL

## 2025-02-18 ENCOUNTER — TELEPHONE (OUTPATIENT)
Dept: OBSTETRICS AND GYNECOLOGY | Facility: CLINIC | Age: 30
End: 2025-02-18
Payer: COMMERCIAL

## 2025-02-18 DIAGNOSIS — O23.40 URINARY TRACT INFECTION IN MOTHER DURING PREGNANCY, ANTEPARTUM: Primary | ICD-10-CM

## 2025-02-18 RX ORDER — NITROFURANTOIN 25; 75 MG/1; MG/1
100 CAPSULE ORAL 2 TIMES DAILY
Qty: 14 CAPSULE | Refills: 0 | Status: SHIPPED | OUTPATIENT
Start: 2025-02-18 | End: 2025-02-25

## 2025-02-18 NOTE — TELEPHONE ENCOUNTER
OP patient  37w1d    Patient states she went to L&D on Saturday 2/15 for dizziness. She states she has been getting dizzy spells and hot flashes. She denies any LOF or bleeding. She denies dysuria, frequency, fever, or urgency. She states [facility] and is worried she may have UTI. RN will discuss with OP and call patient back. Urine culture was not collected on 2/15 in L&D. She states baby movement is good.     Per OP, call in Macrobid prescription. Patient VU

## 2025-02-18 NOTE — TELEPHONE ENCOUNTER
Please see labs from 02/15 from labor and delivery PT has abnormal labs including a UTI per PT - PT went in for dizzy spells and is still having them. PT is 37 and 1 day pregnant. Sending high priority due to the UTI.

## 2025-02-21 ENCOUNTER — ROUTINE PRENATAL (OUTPATIENT)
Dept: OBSTETRICS AND GYNECOLOGY | Facility: CLINIC | Age: 30
End: 2025-02-21
Payer: COMMERCIAL

## 2025-02-21 ENCOUNTER — HOSPITAL ENCOUNTER (OUTPATIENT)
Facility: HOSPITAL | Age: 30
Discharge: HOME | End: 2025-02-21
Attending: OBSTETRICS & GYNECOLOGY | Admitting: OBSTETRICS & GYNECOLOGY
Payer: COMMERCIAL

## 2025-02-21 VITALS — WEIGHT: 173 LBS | BODY MASS INDEX: 32.69 KG/M2 | DIASTOLIC BLOOD PRESSURE: 62 MMHG | SYSTOLIC BLOOD PRESSURE: 102 MMHG

## 2025-02-21 VITALS
WEIGHT: 175 LBS | HEART RATE: 93 BPM | DIASTOLIC BLOOD PRESSURE: 72 MMHG | TEMPERATURE: 99.1 F | SYSTOLIC BLOOD PRESSURE: 107 MMHG | RESPIRATION RATE: 18 BRPM | BODY MASS INDEX: 33.04 KG/M2 | HEIGHT: 61 IN | OXYGEN SATURATION: 97 %

## 2025-02-21 DIAGNOSIS — Z34.83 PRENATAL CARE, SUBSEQUENT PREGNANCY IN THIRD TRIMESTER: ICD-10-CM

## 2025-02-21 DIAGNOSIS — Z53.21 PATIENT LEFT WITHOUT BEING SEEN: Primary | ICD-10-CM

## 2025-02-21 LAB
EXPIRATION DATE: 0
GLUCOSE UR STRIP-MCNC: NEGATIVE MG/DL
Lab: 0
PROT UR STRIP-MCNC: ABNORMAL MG/DL

## 2025-02-21 PROCEDURE — G0463 HOSPITAL OUTPT CLINIC VISIT: HCPCS

## 2025-02-21 PROCEDURE — 59025 FETAL NON-STRESS TEST: CPT

## 2025-02-21 NOTE — PROGRESS NOTES
OB FOLLOW UP  CC- Here for care of pregnancy        Silvia Bravo is a 29 y.o.  37w4d patient being seen today for her obstetrical follow up visit. Patient reports contractions that occur every 10mins or further apart. She was seen on L&D last night and they ruled out labor. She was dilated to 1.5cm. She denies bleeding, LOF, or increased discharge. Reports good fetal movement.     Her prenatal care is complicated by (and status) :   Patient Active Problem List   Diagnosis    Palpitations    Allergic rhinitis    Eustachian tube disorder    Generalized anxiety disorder    Pelvic and perineal pain    Ventricular premature beats    Bipolar disorder    Functional diarrhea    Effusion of right prepatellar bursa    Sinusitis    Multiple thyroid nodules    Attention deficit hyperactivity disorder (ADHD), combined type    Gonorrhea    Bacterial vaginosis    Vaginal candidiasis    Substance-induced seizure    Third trimester pregnancy       GBS Status:   Group B Strep Culture   Date Value Ref Range Status   2025 No Group B Streptococcus Isolated at 2 days  Final         Allergies   Allergen Reactions    Mushroom Anaphylaxis    Latex Rash            Her Delivery Plan is: [facility] IOL at 39wks. Scheduled    US today: no  Non Stress Test: No.    ROS -   Patient Denies: Loss of Fluid, Vaginal Spotting, Vision Changes, Headaches, Nausea , Vomiting , Epigastric pain, and skin itching  Fetal Movement : normal  Other than what is documented in the HPI, all other systems reviewed and are negative.       The additional following portions of the patient's history were reviewed and updated as appropriate: allergies, current medications, past family history, past medical history, past social history, past surgical history, and problem list.    I have reviewed and agree with the HPI, ROS, and historical information as entered above.       EXAM:                                    Assessment and Plan    Problem List  Items Addressed This Visit    None  Visit Diagnoses       Prenatal care, subsequent pregnancy in third trimester    -  Primary    Relevant Orders    POC Protein, Urine, Qualitative, Dipstick    POC Glucose, Urine, Qualitative, Dipstick            Pregnancy at 37w4d  Fetal status reassuring.     Delivery options reviewed with patient  Signs of labor reviewed  Kick counts reviewed  Activity and Exercise discussed.  No follow-ups on file.    Tova Morales MA  02/21/2025 The patient left the office  care was provided and did not complete the visit

## 2025-02-21 NOTE — H&P
[facility]  Obstetric History and Physical    Chief Complaint   Patient presents with    Contractions     Ctxs x 3 hours has not been able to go back to sleep.  Reports ctxs q 10 min        Subjective     Patient is a 29 y.o. female  currently at 37w4d, who presents with painful contractions. She denies vaginal bleeding, vaginal discharge, and LOF, She endorses good fetal movement.     Her prenatal care is complicated by several social barriers.  Her previous obstetric/gynecological history is noted for STIs and mental health complications     The following portions of the patients history were reviewed and updated as appropriate: current medications, allergies, past medical history, past surgical history, past family history, past social history, and problem list .       Prenatal Information:  Prenatal Results       Initial Prenatal Labs       Test Value Reference Range Date Time    Hemoglobin  12.2 g/dL 11.1 - 15.9 08/15/24 1407    Hematocrit  40.2 % 34.0 - 46.6 08/15/24 1407    Platelets  377 x10E3/uL 150 - 450 08/15/24 1407    Rubella IgG  3.89 index Immune >0.99 08/15/24 1407    Hepatitis B SAg  Negative  Negative 08/15/24 1407    Hepatitis C Ab  Non Reactive  Non Reactive 08/15/24 1407    RPR  Non Reactive  Non Reactive 24 1218       Non Reactive  Non Reactive 08/15/24 1407    T. Pallidum Ab         ABO  O   08/15/24 1407    Rh  Positive   08/15/24 1407    Antibody Screen  Negative  Negative 08/15/24 1407    HIV  Non Reactive  Non Reactive 08/15/24 1407    Urine Culture  >100,000 CFU/mL Normal Urogenital Jennie   24 0411       50,000 CFU/mL Normal Urogenital Jennie   24 1108       Final report   08/15/24 1407    Gonorrhea        Chlamydia        TSH        HgB A1c         Varicella IgG        Hemoglobinopathy Fractionation        Hemoglobinopathy (genetic testing)        Cystic fibrosis         Spinal muscular atrophy        Fragile X                  Fetal testing        Test Value  Reference Range Date Time    NIPT        MSAFP        AFP-4                  2nd and 3rd Trimester       Test Value Reference Range Date Time    Hemoglobin (repeated)  11.1 g/dL 12.0 - 15.9 12/19/24 1218    Hematocrit (repeated)  33.4 % 34.0 - 46.6 12/19/24 1218    Platelets   270 10*3/mm3 140 - 450 12/19/24 1218       377 x10E3/uL 150 - 450 08/15/24 1407    1 hour GTT   113 mg/dL 65 - 139 12/19/24 1218    Antibody Screen (repeated)  Negative  Negative 12/19/24 1218    3rd TM syphilis scrn (repeated)  RPR   Non Reactive  Non Reactive 12/19/24 1218    3rd TM syphilis scrn (repeated) TP-Ab        3rd TM syphilis screen TB-Ab (FTA)        Syphilis cascade test TP-Ab (EIA)        Syphilis cascade TPPA        GTT Fasting        GTT 1 Hr        GTT 2 Hr        GTT 3 Hr        Group B Strep  No Group B Streptococcus Isolated at 2 days   02/13/25 1844              Other testing        Test Value Reference Range Date Time    Parvo IgG         CMV IgG                   Drug Screening       Test Value Reference Range Date Time    Amphetamine Screen  Negative  Negative 12/25/24 0411       Negative ng/mL Ibprfi=9319 08/15/24 1407    Barbiturate Screen  Negative  Negative 12/25/24 0411       Negative ng/mL Tjgsmk=943 08/15/24 1407    Benzodiazepine Screen  Negative  Negative 12/25/24 0411       Negative ng/mL Miawtv=137 08/15/24 1407    Methadone Screen  Negative  Negative 12/25/24 0411       Negative ng/mL Btnwmz=545 08/15/24 1407    Phencyclidine Screen  Negative  Negative 12/25/24 0411       Negative ng/mL Cutoff=25 08/15/24 1407    Opiates Screen  Negative  Negative 12/25/24 0411       Negative ng/mL Znzxgw=609 08/15/24 1407    THC Screen  Negative  Negative 12/25/24 0411       Negative ng/mL Cutoff=20 08/15/24 1407    Cocaine Screen  Negative  Negative 12/25/24 0411       Negative ng/mL Hnyscn=263 08/15/24 1407    Propoxyphene Screen  Negative ng/mL Hqrvps=487 08/15/24 1407    Buprenorphine Screen  Negative  Negative  12/25/24 0411    Methamphetamine Screen  Negative  Negative 12/25/24 0411    Oxycodone Screen  Negative  Negative 12/25/24 0411    Tricyclic Antidepressants Screen  Negative  Negative 12/25/24 0411              Legend    ^: Historical                          External Prenatal Results       Pregnancy Outside Results - Transcribed From Office Records - See Scanned Records For Details       Test Value Date Time    ABO  O  08/15/24 1407    Rh  Positive  08/15/24 1407    Antibody Screen  Negative  12/19/24 1218       Negative  08/15/24 1407    Varicella IgG       Rubella  3.89 index 08/15/24 1407    Hgb  11.1 g/dL 12/19/24 1218       12.2 g/dL 08/15/24 1407    Hct  33.4 % 12/19/24 1218       40.2 % 08/15/24 1407    HgB A1c        1h GTT  113 mg/dL 12/19/24 1218    3h GTT Fasting       3h GTT 1 hour       3h GTT 2 hour       3h GTT 3 hour        Gonorrhea (discrete)       Chlamydia (discrete)       RPR  Non Reactive  12/19/24 1218       Non Reactive  08/15/24 1407    Syphils cascade: TP-Ab (FTA)       TP-Ab       TP-Ab (EIA)       TPPA       HBsAg  Negative  08/15/24 1407    Herpes Simplex Virus PCR       Herpes Simplex VIrus Culture       HIV  Non Reactive  08/15/24 1407    Hep C RNA Quant PCR       Hep C Antibody  Non Reactive  08/15/24 1407    AFP       NIPT       Cystic Fibrosis (Jose R)       Cystic Fibroisis        Spinal Muscular atrophy       Fragile X       Group B Strep  No Group B Streptococcus Isolated at 2 days  02/13/25 1844    GBS Susceptibility to Clindamycin       GBS Susceptibility to Erythromycin       Fetal Fibronectin       Genetic Testing, Maternal Blood                 Drug Screening       Test Value Date Time    Urine Drug Screen       Amphetamine Screen  Negative  12/25/24 0411       Negative ng/mL 08/15/24 1407    Barbiturate Screen  Negative  12/25/24 0411       Negative ng/mL 08/15/24 1407    Benzodiazepine Screen  Negative  12/25/24 0411       Negative ng/mL 08/15/24 1407    Methadone Screen   "Negative  24 0411       Negative ng/mL 08/15/24 1407    Phencyclidine Screen  Negative  24 0411       Negative ng/mL 08/15/24 1407    Opiates Screen  Negative  24 0411    THC Screen  Negative  24 0411    Cocaine Screen       Propoxyphene Screen  Negative ng/mL 08/15/24 1407    Buprenorphine Screen  Negative  24 0411    Methamphetamine Screen       Oxycodone Screen  Negative  24 0411    Tricyclic Antidepressants Screen  Negative  24 0411              Legend    ^: Historical                             Past OB History:     OB History    Para Term  AB Living   4 1 0 1 2 1   SAB IAB Ectopic Molar Multiple Live Births   0 2 0 0 0 1      # Outcome Date GA Lbr Srinivasa/2nd Weight Sex Type Anes PTL Lv   4 Current            3 IAB 22           2 IAB 20           1  17 36w0d  2892 g (6 lb 6 oz) M Vag-Spont  N NOEMY       Past Medical History: Past Medical History:   Diagnosis Date    ADHD     Anxiety     Bipolar disorder     Depression     GERD (gastroesophageal reflux disease)     History of drug overdose 2024    cocaine and xanax    History of palpitations     History of syphilis 2023    History of thyroid cyst 2022    \"She has tiny thyroid cysts. These are too small to cause symptoms.\"    Kidney infection     Kidney stones     Ventricular premature beats       Past Surgical History Past Surgical History:   Procedure Laterality Date    ADENOIDECTOMY      CHOLECYSTECTOMY      TONSILLECTOMY      WISDOM TOOTH EXTRACTION        Family History: Family History   Problem Relation Age of Onset    Sleep apnea Father     No Known Problems Brother     No Known Problems Sister     No Known Problems Paternal Grandfather     No Known Problems Paternal Grandmother     Atrial fibrillation Maternal Grandmother     Skin cancer Maternal Grandmother     Diabetes Maternal Grandfather     Hypertension Maternal Grandfather     Stroke Maternal Grandfather     Heart " attack Maternal Grandfather     Ovarian cancer Maternal Aunt     Breast cancer Maternal Aunt     Uterine cancer Neg Hx     Colon cancer Neg Hx       Social History:  reports that she has been smoking cigarettes. She has a 2.5 pack-year smoking history. She has been exposed to tobacco smoke. She has never used smokeless tobacco.   reports that she does not currently use alcohol.   reports that she does not currently use drugs after having used the following drugs: Marijuana and Cocaine(coke).        Review of Systems      Objective     Vital Signs Range for the last 24 hours  Temperature: Temp:  [99.1 °F (37.3 °C)] 99.1 °F (37.3 °C)   Temp Source: Temp src: Oral   BP: BP: (107)/(72) 107/72   Pulse: Heart Rate:  [93] 93   Respirations: Resp:  [18] 18   SPO2: SpO2:  [97 %] 97 %   O2 Amount (l/min):     O2 Devices Device (Oxygen Therapy): room air   Weight: Weight:  [79.4 kg (175 lb)] 79.4 kg (175 lb)     Physical Examination: General appearance - alert, well appearing, and in no distress  Eyes - EMOI  Chest - nonlabored   Heart - regular rate   Abdomen - soft, nontender, nondistended, no masses or organomegaly  Extremities - peripheral pulses normal, no pedal edema, no clubbing or cyanosis    Cervix: Exam by:  triage nurse    Dilation: Cervical Dilation (cm): 0-1   Effacement: Cervical Effacement: 50   Station:       Fetal Heart Rate Assessment   NST reactive     Uterine Assessment   TOCO ctx q4-9 mins         Assessment & Plan       Assessment & Plan    Assessment:  1.  Intrauterine pregnancy at 37w4d gestation with reactive fetal status.    2. Not in labor, membranes intact  3.  Obstetrical history significant for is remarkable for mental health issues and STI.  4.  GBS status: no group 2 days   Group B Strep Culture   Date Value Ref Range Status   02/13/2025 No Group B Streptococcus Isolated at 2 days  Final       Plan:  1. Discharge to home.    2. Plan of care has been reviewed with patient  3.  Risks, benefits of  treatment plan have been discussed.  4.  All questions have been answered.  5.  Follow up with OBGYN today at 8am   6.  Continue medications as prescribed   7.  Labor and fetal movement precautions given .    Lucita Silver MD  2/21/2025  04:46 EST

## 2025-02-25 ENCOUNTER — ROUTINE PRENATAL (OUTPATIENT)
Dept: OBSTETRICS AND GYNECOLOGY | Facility: CLINIC | Age: 30
End: 2025-02-25
Payer: COMMERCIAL

## 2025-02-25 VITALS — BODY MASS INDEX: 32.88 KG/M2 | SYSTOLIC BLOOD PRESSURE: 110 MMHG | DIASTOLIC BLOOD PRESSURE: 70 MMHG | WEIGHT: 174 LBS

## 2025-02-25 DIAGNOSIS — Z34.93 PRENATAL CARE IN THIRD TRIMESTER: Primary | ICD-10-CM

## 2025-02-25 DIAGNOSIS — F19.10 SUBSTANCE ABUSE AFFECTING PREGNANCY, ANTEPARTUM: ICD-10-CM

## 2025-02-25 DIAGNOSIS — N89.8 VAGINAL DISCHARGE DURING PREGNANCY IN THIRD TRIMESTER: ICD-10-CM

## 2025-02-25 DIAGNOSIS — O26.893 VAGINAL DISCHARGE DURING PREGNANCY IN THIRD TRIMESTER: ICD-10-CM

## 2025-02-25 DIAGNOSIS — O99.320 SUBSTANCE ABUSE AFFECTING PREGNANCY, ANTEPARTUM: ICD-10-CM

## 2025-02-25 LAB
GLUCOSE UR STRIP-MCNC: NEGATIVE MG/DL
PROT UR STRIP-MCNC: NEGATIVE MG/DL

## 2025-02-25 NOTE — PROGRESS NOTES
OB FOLLOW UP  CC- Here for care of pregnancy        Silvia Bravo is a 29 y.o.  38w1d patient being seen today for her obstetrical follow up visit. Patient reports possible leaking of fluid or increased watery discharge.     Her prenatal care is complicated by (and status) :  Patient Active Problem List   Diagnosis    Palpitations    Eustachian tube disorder    Generalized anxiety disorder    Ventricular premature beats    Bipolar disorder    Functional diarrhea    Effusion of right prepatellar bursa    Sinusitis    Multiple thyroid nodules    Attention deficit hyperactivity disorder (ADHD), combined type    Gonorrhea    Bacterial vaginosis    Vaginal candidiasis    Substance-induced seizure    Third trimester pregnancy    Substance abuse affecting pregnancy, antepartum       GBS Status:   Group B Strep Culture   Date Value Ref Range Status   2025 No Group B Streptococcus Isolated at 2 days  Final         Allergies   Allergen Reactions    Mushroom Anaphylaxis    Latex Rash          Flu Status: Declines  Her Delivery Plan is:  Induction scheduled 3/6/2025     US today: no  Non Stress Test: No.    ROS -   Patient Denies: Loss of Fluid, Vaginal Spotting, Vision Changes, Headaches, Nausea , Vomiting , Epigastric pain, and skin itching  Fetal Movement : normal  Other than what is documented in the HPI, all other systems reviewed and are negative.       The additional following portions of the patient's history were reviewed and updated as appropriate: allergies and current medications.    I have reviewed and agree with the HPI, ROS, and historical information as entered above. Taty Briseno, APRN        EXAM:     Prenatal Vitals  BP: 110/70  Weight: 78.9 kg (174 lb)   Fetal Heart Rate: 150       Dilation/Effacement/Station  Dilation: 1  Effacement (%): 60  Station: -2      Urine Glucose Read-only: Negative  Urine Protein Read-only: Negative           Assessment and Plan    Problem List Items  Addressed This Visit          Gravid and     Substance abuse affecting pregnancy, antepartum    Overview     Substance Abuse-cocaine and xanax-last used 4 days prior to NOB visit, admits alcohol use.  **patient desires to go back to sober living  Depression/anxiety/bipolar-stopped lamictal and trazodone  Off all meds 24          Other Visit Diagnoses       Prenatal care in third trimester    -  Primary    Relevant Orders    POC Urinalysis Dipstick (Completed)    Vaginal discharge during pregnancy in third trimester                Pregnancy at 38w1d  Fetal status reassuring.   Reviewed Pre-eclampsia signs/symptoms  Discussed IOL options with patient. Pt. desires IOL at 39 weeks.   Delivery options reviewed with patient  Signs of labor reviewed  Kick counts reviewed  Activity and Exercise discussed.  No signs of ROM-Nitrazine negative. Pooling negative. Ferning negative.   Follow up next week with OP before induction.    Taty Briseno, APRN  2025

## 2025-03-04 ENCOUNTER — PREP FOR SURGERY (OUTPATIENT)
Dept: OTHER | Facility: HOSPITAL | Age: 30
End: 2025-03-04
Payer: COMMERCIAL

## 2025-03-04 ENCOUNTER — ROUTINE PRENATAL (OUTPATIENT)
Dept: OBSTETRICS AND GYNECOLOGY | Facility: CLINIC | Age: 30
End: 2025-03-04
Payer: COMMERCIAL

## 2025-03-04 ENCOUNTER — TELEPHONE (OUTPATIENT)
Dept: OBSTETRICS AND GYNECOLOGY | Facility: CLINIC | Age: 30
End: 2025-03-04

## 2025-03-04 VITALS — SYSTOLIC BLOOD PRESSURE: 98 MMHG | BODY MASS INDEX: 32.88 KG/M2 | WEIGHT: 174 LBS | DIASTOLIC BLOOD PRESSURE: 68 MMHG

## 2025-03-04 DIAGNOSIS — Z34.93 PRENATAL CARE IN THIRD TRIMESTER: Primary | ICD-10-CM

## 2025-03-04 LAB
GLUCOSE UR STRIP-MCNC: NEGATIVE MG/DL
PROT UR STRIP-MCNC: NEGATIVE MG/DL

## 2025-03-04 RX ORDER — LIDOCAINE HYDROCHLORIDE 10 MG/ML
0.5 INJECTION, SOLUTION EPIDURAL; INFILTRATION; INTRACAUDAL; PERINEURAL ONCE AS NEEDED
Status: CANCELLED | OUTPATIENT
Start: 2025-03-04

## 2025-03-04 RX ORDER — MAGNESIUM CARB/ALUMINUM HYDROX 105-160MG
30 TABLET,CHEWABLE ORAL ONCE
Status: CANCELLED | OUTPATIENT
Start: 2025-03-04 | End: 2025-03-04

## 2025-03-04 RX ORDER — ACETAMINOPHEN 325 MG/1
650 TABLET ORAL EVERY 4 HOURS PRN
Status: CANCELLED | OUTPATIENT
Start: 2025-03-04

## 2025-03-04 RX ORDER — OXYTOCIN/0.9 % SODIUM CHLORIDE 30/500 ML
2 PLASTIC BAG, INJECTION (ML) INTRAVENOUS
Status: CANCELLED | OUTPATIENT
Start: 2025-03-06

## 2025-03-04 RX ORDER — PROMETHAZINE HYDROCHLORIDE 12.5 MG/1
12.5 SUPPOSITORY RECTAL EVERY 6 HOURS PRN
Status: CANCELLED | OUTPATIENT
Start: 2025-03-04

## 2025-03-04 RX ORDER — MORPHINE SULFATE 2 MG/ML
2 INJECTION, SOLUTION INTRAMUSCULAR; INTRAVENOUS
Status: CANCELLED | OUTPATIENT
Start: 2025-03-04 | End: 2025-03-14

## 2025-03-04 RX ORDER — MISOPROSTOL 200 UG/1
800 TABLET ORAL AS NEEDED
Status: CANCELLED | OUTPATIENT
Start: 2025-03-04

## 2025-03-04 RX ORDER — CARBOPROST TROMETHAMINE 250 UG/ML
250 INJECTION, SOLUTION INTRAMUSCULAR AS NEEDED
Status: CANCELLED | OUTPATIENT
Start: 2025-03-04

## 2025-03-04 RX ORDER — OXYTOCIN/0.9 % SODIUM CHLORIDE 30/500 ML
250 PLASTIC BAG, INJECTION (ML) INTRAVENOUS CONTINUOUS
Status: CANCELLED | OUTPATIENT
Start: 2025-03-04 | End: 2025-03-04

## 2025-03-04 RX ORDER — OXYCODONE AND ACETAMINOPHEN 7.5; 325 MG/1; MG/1
2 TABLET ORAL EVERY 4 HOURS PRN
Status: CANCELLED | OUTPATIENT
Start: 2025-03-04 | End: 2025-03-14

## 2025-03-04 RX ORDER — SODIUM CHLORIDE 0.9 % (FLUSH) 0.9 %
10 SYRINGE (ML) INJECTION AS NEEDED
Status: CANCELLED | OUTPATIENT
Start: 2025-03-04

## 2025-03-04 RX ORDER — OXYTOCIN/0.9 % SODIUM CHLORIDE 30/500 ML
999 PLASTIC BAG, INJECTION (ML) INTRAVENOUS ONCE
Status: CANCELLED | OUTPATIENT
Start: 2025-03-04 | End: 2025-03-04

## 2025-03-04 RX ORDER — PROMETHAZINE HYDROCHLORIDE 12.5 MG/1
12.5 TABLET ORAL EVERY 6 HOURS PRN
Status: CANCELLED | OUTPATIENT
Start: 2025-03-04

## 2025-03-04 RX ORDER — SODIUM CHLORIDE 9 MG/ML
40 INJECTION, SOLUTION INTRAVENOUS AS NEEDED
Status: CANCELLED | OUTPATIENT
Start: 2025-03-04

## 2025-03-04 RX ORDER — METHYLERGONOVINE MALEATE 0.2 MG/ML
200 INJECTION INTRAVENOUS ONCE AS NEEDED
Status: CANCELLED | OUTPATIENT
Start: 2025-03-04

## 2025-03-04 RX ORDER — SODIUM CHLORIDE 0.9 % (FLUSH) 0.9 %
10 SYRINGE (ML) INJECTION EVERY 12 HOURS SCHEDULED
Status: CANCELLED | OUTPATIENT
Start: 2025-03-04

## 2025-03-04 NOTE — TELEPHONE ENCOUNTER
PT is calling because she spoke with a nurse yesterday regarding moving up her apt today - she states she sees no point in coming in today unless she is able to get induced today. She would either like to be induced today or she is not coming to her apt today.  Would like a call back from a nurse regarding this.

## 2025-03-04 NOTE — TELEPHONE ENCOUNTER
Patient of Dr. Lindsay;  @ 39w 1d. LOV 25; NOV is scheduled for today. Patient is scheduled for IOL 25 @ 0500.   Returned patient's call.   States she doesn't see the point in coming in today since she will be induced 25.   Reports normal fetal movement. States she is uncomfortable and has trouble sitting in the car to get here.  Discussed the importance of prenatal visits to check on maternal and fetal well being. Strongly encouraged her to keep appointment here today.   She v/u and said she will try to make it in today.

## 2025-03-04 NOTE — PROGRESS NOTES
OB FOLLOW UP  CC- Here for care of pregnancy        Silvia Bravo is a 29 y.o.  39w1d patient being seen today for her obstetrical follow up visit. Patient reports [facility].     Her prenatal care is complicated by (and status) : see below.  Patient Active Problem List   Diagnosis    Palpitations    Eustachian tube disorder    Generalized anxiety disorder    Ventricular premature beats    Bipolar disorder    Functional diarrhea    Effusion of right prepatellar bursa    Sinusitis    Multiple thyroid nodules    Attention deficit hyperactivity disorder (ADHD), combined type    Gonorrhea    Bacterial vaginosis    Vaginal candidiasis    Substance-induced seizure    Third trimester pregnancy    Substance abuse affecting pregnancy, antepartum       GBS Status:   Group B Strep Culture   Date Value Ref Range Status   2025 No Group B Streptococcus Isolated at 2 days  Final         Allergies   Allergen Reactions    Mushroom Anaphylaxis    Latex Rash          Her Delivery Plan is: Desires IOL at 39wks. Scheduled    US today: no  Non Stress Test: No.    ROS -   Patient Denies: Loss of Fluid, Vaginal Spotting, Vision Changes, Headaches, Nausea , Vomiting , Epigastric pain, and skin itching  Fetal Movement : normal  Other than what is documented in the HPI, all other systems reviewed and are negative.       The additional following portions of the patient's history were reviewed and updated as appropriate: allergies, current medications, and problem list.    I have reviewed and agree with the HPI, ROS, and historical information as entered above. Perfecto Lindsay MD        EXAM:     Prenatal Vitals  BP: 98/68 (r arm)  Weight: 78.9 kg (174 lb)       Fundal Height (cm): 139 cm          Urine Glucose Read-only: Negative  Urine Protein Read-only: Negative           Assessment and Plan    Problem List Items Addressed This Visit    None  Visit Diagnoses       Prenatal care in third trimester    -  Primary    Relevant  Orders    POC Urinalysis Dipstick (Completed)            Pregnancy at 39w1d  Fetal status reassuring.   Reviewed Pre-eclampsia signs/symptoms  Reviewed upcoming IOL with patient. Instructions given.  Delivery options reviewed with patient  Signs of labor reviewed  Kick counts reviewed  Activity and Exercise discussed.  No follow-ups on file.    Perfecto Lindsay MD  03/04/2025

## 2025-03-05 ENCOUNTER — PREP FOR SURGERY (OUTPATIENT)
Dept: OTHER | Facility: HOSPITAL | Age: 30
End: 2025-03-05
Payer: COMMERCIAL

## 2025-03-06 ENCOUNTER — ANESTHESIA (OUTPATIENT)
Dept: LABOR AND DELIVERY | Facility: HOSPITAL | Age: 30
End: 2025-03-06
Payer: COMMERCIAL

## 2025-03-06 ENCOUNTER — HOSPITAL ENCOUNTER (OUTPATIENT)
Dept: LABOR AND DELIVERY | Facility: HOSPITAL | Age: 30
Discharge: HOME | End: 2025-03-06
Payer: COMMERCIAL

## 2025-03-06 ENCOUNTER — ANESTHESIA EVENT (OUTPATIENT)
Dept: LABOR AND DELIVERY | Facility: HOSPITAL | Age: 30
End: 2025-03-06
Payer: COMMERCIAL

## 2025-03-06 ENCOUNTER — HOSPITAL ENCOUNTER (INPATIENT)
Facility: HOSPITAL | Age: 30
LOS: 2 days | Discharge: HOME | End: 2025-03-08
Attending: OBSTETRICS & GYNECOLOGY | Admitting: OBSTETRICS & GYNECOLOGY
Payer: COMMERCIAL

## 2025-03-06 PROBLEM — Z34.90 ENCOUNTER FOR ELECTIVE INDUCTION OF LABOR: Status: ACTIVE | Noted: 2025-03-06

## 2025-03-06 PROBLEM — Z3A.39 39 WEEKS GESTATION OF PREGNANCY: Status: ACTIVE | Noted: 2025-03-06

## 2025-03-06 PROBLEM — Z34.90 CURRENTLY PREGNANT: Status: ACTIVE | Noted: 2025-03-06

## 2025-03-06 LAB
ABO GROUP BLD: NORMAL
ABO GROUP BLD: NORMAL
AMPHET+METHAMPHET UR QL: NEGATIVE
AMPHETAMINES UR QL: NEGATIVE
BARBITURATES UR QL SCN: NEGATIVE
BENZODIAZ UR QL SCN: NEGATIVE
BLD GP AB SCN SERPL QL: NEGATIVE
BUPRENORPHINE SERPL-MCNC: NEGATIVE NG/ML
CANNABINOIDS SERPL QL: NEGATIVE
COCAINE UR QL: NEGATIVE
DEPRECATED RDW RBC AUTO: 41.5 FL (ref 37–54)
ERYTHROCYTE [DISTWIDTH] IN BLOOD BY AUTOMATED COUNT: 15.1 % (ref 12.3–15.4)
FENTANYL UR-MCNC: NEGATIVE NG/ML
HCT VFR BLD AUTO: 33.8 % (ref 34–46.6)
HGB BLD-MCNC: 10.8 G/DL (ref 12–15.9)
MCH RBC QN AUTO: 24.8 PG (ref 26.6–33)
MCHC RBC AUTO-ENTMCNC: 32 G/DL (ref 31.5–35.7)
MCV RBC AUTO: 77.5 FL (ref 79–97)
METHADONE UR QL SCN: NEGATIVE
OPIATES UR QL: NEGATIVE
OXYCODONE UR QL SCN: NEGATIVE
PCP UR QL SCN: NEGATIVE
PLATELET # BLD AUTO: 279 10*3/MM3 (ref 140–450)
PMV BLD AUTO: 12.4 FL (ref 6–12)
RBC # BLD AUTO: 4.36 10*6/MM3 (ref 3.77–5.28)
RH BLD: POSITIVE
RH BLD: POSITIVE
RPR SER QL: NORMAL
T&S EXPIRATION DATE: NORMAL
TREPONEMA PALLIDUM IGG+IGM AB [PRESENCE] IN SERUM OR PLASMA BY IMMUNOASSAY: REACTIVE
TRICYCLICS UR QL SCN: NEGATIVE
WBC NRBC COR # BLD AUTO: 11.87 10*3/MM3 (ref 3.4–10.8)

## 2025-03-06 PROCEDURE — 86901 BLOOD TYPING SEROLOGIC RH(D): CPT

## 2025-03-06 PROCEDURE — C1755 CATHETER, INTRASPINAL: HCPCS

## 2025-03-06 PROCEDURE — 3E033VJ INTRODUCTION OF OTHER HORMONE INTO PERIPHERAL VEIN, PERCUTANEOUS APPROACH: ICD-10-PCS | Performed by: OBSTETRICS & GYNECOLOGY

## 2025-03-06 PROCEDURE — 25810000003 LACTATED RINGERS SOLUTION: Performed by: OBSTETRICS & GYNECOLOGY

## 2025-03-06 PROCEDURE — 86901 BLOOD TYPING SEROLOGIC RH(D): CPT | Performed by: OBSTETRICS & GYNECOLOGY

## 2025-03-06 PROCEDURE — 86780 TREPONEMA PALLIDUM: CPT | Performed by: OBSTETRICS & GYNECOLOGY

## 2025-03-06 PROCEDURE — 86592 SYPHILIS TEST NON-TREP QUAL: CPT | Performed by: OBSTETRICS & GYNECOLOGY

## 2025-03-06 PROCEDURE — 51703 INSERT BLADDER CATH COMPLEX: CPT

## 2025-03-06 PROCEDURE — 86900 BLOOD TYPING SEROLOGIC ABO: CPT | Performed by: OBSTETRICS & GYNECOLOGY

## 2025-03-06 PROCEDURE — 25010000002 LIDOCAINE-EPINEPHRINE (PF) 1.5 %-1:200000 SOLUTION: Performed by: NURSE ANESTHETIST, CERTIFIED REGISTERED

## 2025-03-06 PROCEDURE — 86900 BLOOD TYPING SEROLOGIC ABO: CPT

## 2025-03-06 PROCEDURE — 25810000003 LACTATED RINGERS PER 1000 ML: Performed by: OBSTETRICS & GYNECOLOGY

## 2025-03-06 PROCEDURE — 85027 COMPLETE CBC AUTOMATED: CPT | Performed by: OBSTETRICS & GYNECOLOGY

## 2025-03-06 PROCEDURE — 80307 DRUG TEST PRSMV CHEM ANLYZR: CPT | Performed by: OBSTETRICS & GYNECOLOGY

## 2025-03-06 PROCEDURE — C1755 CATHETER, INTRASPINAL: HCPCS | Performed by: ANESTHESIOLOGY

## 2025-03-06 PROCEDURE — 25010000002 FENTANYL CITRATE (PF) 50 MCG/ML SOLUTION: Performed by: NURSE ANESTHETIST, CERTIFIED REGISTERED

## 2025-03-06 PROCEDURE — 25010000002 ROPIVACAINE PER 1 MG: Performed by: NURSE ANESTHETIST, CERTIFIED REGISTERED

## 2025-03-06 PROCEDURE — 86850 RBC ANTIBODY SCREEN: CPT | Performed by: OBSTETRICS & GYNECOLOGY

## 2025-03-06 PROCEDURE — 36415 COLL VENOUS BLD VENIPUNCTURE: CPT | Performed by: OBSTETRICS & GYNECOLOGY

## 2025-03-06 PROCEDURE — 59025 FETAL NON-STRESS TEST: CPT

## 2025-03-06 PROCEDURE — 0HQ9XZZ REPAIR PERINEUM SKIN, EXTERNAL APPROACH: ICD-10-PCS | Performed by: OBSTETRICS & GYNECOLOGY

## 2025-03-06 PROCEDURE — 25010000002 LIDOCAINE-EPINEPHRINE (PF) 2 %-1:200000 SOLUTION: Performed by: NURSE ANESTHETIST, CERTIFIED REGISTERED

## 2025-03-06 RX ORDER — ONDANSETRON 2 MG/ML
4 INJECTION INTRAMUSCULAR; INTRAVENOUS ONCE AS NEEDED
Status: DISCONTINUED | OUTPATIENT
Start: 2025-03-06 | End: 2025-03-06 | Stop reason: HOSPADM

## 2025-03-06 RX ORDER — LIDOCAINE HYDROCHLORIDE 10 MG/ML
0.5 INJECTION, SOLUTION EPIDURAL; INFILTRATION; INTRACAUDAL; PERINEURAL ONCE AS NEEDED
Status: DISCONTINUED | OUTPATIENT
Start: 2025-03-06 | End: 2025-03-06 | Stop reason: HOSPADM

## 2025-03-06 RX ORDER — EPHEDRINE SULFATE 5 MG/ML
10 INJECTION INTRAVENOUS
Status: DISCONTINUED | OUTPATIENT
Start: 2025-03-06 | End: 2025-03-06 | Stop reason: HOSPADM

## 2025-03-06 RX ORDER — ACETAMINOPHEN 325 MG/1
650 TABLET ORAL EVERY 4 HOURS PRN
Status: DISCONTINUED | OUTPATIENT
Start: 2025-03-06 | End: 2025-03-06 | Stop reason: HOSPADM

## 2025-03-06 RX ORDER — METHYLERGONOVINE MALEATE 0.2 MG/ML
200 INJECTION INTRAVENOUS ONCE AS NEEDED
Status: DISCONTINUED | OUTPATIENT
Start: 2025-03-06 | End: 2025-03-06 | Stop reason: HOSPADM

## 2025-03-06 RX ORDER — HYDROCORTISONE 25 MG/G
1 CREAM TOPICAL AS NEEDED
Status: DISCONTINUED | OUTPATIENT
Start: 2025-03-06 | End: 2025-03-08 | Stop reason: HOSPADM

## 2025-03-06 RX ORDER — IBUPROFEN 600 MG/1
600 TABLET, FILM COATED ORAL EVERY 6 HOURS PRN
Status: DISCONTINUED | OUTPATIENT
Start: 2025-03-06 | End: 2025-03-08 | Stop reason: HOSPADM

## 2025-03-06 RX ORDER — HYDROCODONE BITARTRATE AND ACETAMINOPHEN 10; 325 MG/1; MG/1
1 TABLET ORAL EVERY 4 HOURS PRN
Status: DISCONTINUED | OUTPATIENT
Start: 2025-03-06 | End: 2025-03-08 | Stop reason: HOSPADM

## 2025-03-06 RX ORDER — OXYTOCIN/0.9 % SODIUM CHLORIDE 30/500 ML
999 PLASTIC BAG, INJECTION (ML) INTRAVENOUS ONCE
Status: COMPLETED | OUTPATIENT
Start: 2025-03-06 | End: 2025-03-06

## 2025-03-06 RX ORDER — DOCUSATE SODIUM 100 MG/1
100 CAPSULE, LIQUID FILLED ORAL 2 TIMES DAILY
Status: DISCONTINUED | OUTPATIENT
Start: 2025-03-06 | End: 2025-03-08 | Stop reason: HOSPADM

## 2025-03-06 RX ORDER — PROMETHAZINE HYDROCHLORIDE 12.5 MG/1
12.5 TABLET ORAL EVERY 6 HOURS PRN
Status: DISCONTINUED | OUTPATIENT
Start: 2025-03-06 | End: 2025-03-06 | Stop reason: HOSPADM

## 2025-03-06 RX ORDER — MISOPROSTOL 200 UG/1
800 TABLET ORAL AS NEEDED
Status: DISCONTINUED | OUTPATIENT
Start: 2025-03-06 | End: 2025-03-06 | Stop reason: HOSPADM

## 2025-03-06 RX ORDER — PROMETHAZINE HYDROCHLORIDE 12.5 MG/1
12.5 SUPPOSITORY RECTAL EVERY 6 HOURS PRN
Status: DISCONTINUED | OUTPATIENT
Start: 2025-03-06 | End: 2025-03-06 | Stop reason: HOSPADM

## 2025-03-06 RX ORDER — SODIUM CHLORIDE, SODIUM LACTATE, POTASSIUM CHLORIDE, CALCIUM CHLORIDE 600; 310; 30; 20 MG/100ML; MG/100ML; MG/100ML; MG/100ML
125 INJECTION, SOLUTION INTRAVENOUS CONTINUOUS
Status: ACTIVE | OUTPATIENT
Start: 2025-03-06 | End: 2025-03-07

## 2025-03-06 RX ORDER — METOCLOPRAMIDE HYDROCHLORIDE 5 MG/ML
10 INJECTION INTRAMUSCULAR; INTRAVENOUS ONCE AS NEEDED
Status: DISCONTINUED | OUTPATIENT
Start: 2025-03-06 | End: 2025-03-06 | Stop reason: HOSPADM

## 2025-03-06 RX ORDER — MORPHINE SULFATE 2 MG/ML
2 INJECTION, SOLUTION INTRAMUSCULAR; INTRAVENOUS
Status: DISCONTINUED | OUTPATIENT
Start: 2025-03-06 | End: 2025-03-06 | Stop reason: SDUPTHER

## 2025-03-06 RX ORDER — CITRIC ACID/SODIUM CITRATE 334-500MG
30 SOLUTION, ORAL ORAL ONCE
Status: DISCONTINUED | OUTPATIENT
Start: 2025-03-06 | End: 2025-03-06 | Stop reason: HOSPADM

## 2025-03-06 RX ORDER — LIDOCAINE HCL/EPINEPHRINE/PF 2%-1:200K
VIAL (ML) INJECTION AS NEEDED
Status: DISCONTINUED | OUTPATIENT
Start: 2025-03-06 | End: 2025-03-06 | Stop reason: SURG

## 2025-03-06 RX ORDER — OXYTOCIN/0.9 % SODIUM CHLORIDE 30/500 ML
250 PLASTIC BAG, INJECTION (ML) INTRAVENOUS CONTINUOUS
Status: ACTIVE | OUTPATIENT
Start: 2025-03-06 | End: 2025-03-06

## 2025-03-06 RX ORDER — LIDOCAINE HYDROCHLORIDE AND EPINEPHRINE 15; 5 MG/ML; UG/ML
INJECTION, SOLUTION EPIDURAL AS NEEDED
Status: DISCONTINUED | OUTPATIENT
Start: 2025-03-06 | End: 2025-03-06 | Stop reason: SURG

## 2025-03-06 RX ORDER — OXYCODONE AND ACETAMINOPHEN 7.5; 325 MG/1; MG/1
2 TABLET ORAL EVERY 4 HOURS PRN
Status: DISCONTINUED | OUTPATIENT
Start: 2025-03-06 | End: 2025-03-06 | Stop reason: HOSPADM

## 2025-03-06 RX ORDER — FENTANYL CITRATE 50 UG/ML
INJECTION, SOLUTION INTRAMUSCULAR; INTRAVENOUS AS NEEDED
Status: DISCONTINUED | OUTPATIENT
Start: 2025-03-06 | End: 2025-03-06 | Stop reason: SURG

## 2025-03-06 RX ORDER — MORPHINE SULFATE 2 MG/ML
2 INJECTION, SOLUTION INTRAMUSCULAR; INTRAVENOUS
Status: DISCONTINUED | OUTPATIENT
Start: 2025-03-06 | End: 2025-03-06 | Stop reason: HOSPADM

## 2025-03-06 RX ORDER — OXYTOCIN/0.9 % SODIUM CHLORIDE 30/500 ML
125 PLASTIC BAG, INJECTION (ML) INTRAVENOUS ONCE AS NEEDED
Status: DISCONTINUED | OUTPATIENT
Start: 2025-03-06 | End: 2025-03-08 | Stop reason: HOSPADM

## 2025-03-06 RX ORDER — SODIUM CHLORIDE 0.9 % (FLUSH) 0.9 %
10 SYRINGE (ML) INJECTION AS NEEDED
Status: DISCONTINUED | OUTPATIENT
Start: 2025-03-06 | End: 2025-03-06 | Stop reason: HOSPADM

## 2025-03-06 RX ORDER — PROMETHAZINE HYDROCHLORIDE 25 MG/1
25 TABLET ORAL EVERY 6 HOURS PRN
Status: DISCONTINUED | OUTPATIENT
Start: 2025-03-06 | End: 2025-03-08 | Stop reason: HOSPADM

## 2025-03-06 RX ORDER — BISACODYL 10 MG
10 SUPPOSITORY, RECTAL RECTAL DAILY PRN
Status: DISCONTINUED | OUTPATIENT
Start: 2025-03-07 | End: 2025-03-08 | Stop reason: HOSPADM

## 2025-03-06 RX ORDER — OXYTOCIN/0.9 % SODIUM CHLORIDE 30/500 ML
2 PLASTIC BAG, INJECTION (ML) INTRAVENOUS
Status: DISCONTINUED | OUTPATIENT
Start: 2025-03-06 | End: 2025-03-06 | Stop reason: HOSPADM

## 2025-03-06 RX ORDER — DIPHENHYDRAMINE HYDROCHLORIDE 50 MG/ML
12.5 INJECTION INTRAMUSCULAR; INTRAVENOUS EVERY 8 HOURS PRN
Status: DISCONTINUED | OUTPATIENT
Start: 2025-03-06 | End: 2025-03-06 | Stop reason: HOSPADM

## 2025-03-06 RX ORDER — MAGNESIUM CARB/ALUMINUM HYDROX 105-160MG
30 TABLET,CHEWABLE ORAL ONCE
Status: DISCONTINUED | OUTPATIENT
Start: 2025-03-06 | End: 2025-03-06 | Stop reason: HOSPADM

## 2025-03-06 RX ORDER — FAMOTIDINE 10 MG/ML
20 INJECTION, SOLUTION INTRAVENOUS ONCE AS NEEDED
Status: DISCONTINUED | OUTPATIENT
Start: 2025-03-06 | End: 2025-03-06 | Stop reason: HOSPADM

## 2025-03-06 RX ORDER — SODIUM CHLORIDE 9 MG/ML
40 INJECTION, SOLUTION INTRAVENOUS AS NEEDED
Status: DISCONTINUED | OUTPATIENT
Start: 2025-03-06 | End: 2025-03-06 | Stop reason: HOSPADM

## 2025-03-06 RX ORDER — CARBOPROST TROMETHAMINE 250 UG/ML
250 INJECTION, SOLUTION INTRAMUSCULAR AS NEEDED
Status: DISCONTINUED | OUTPATIENT
Start: 2025-03-06 | End: 2025-03-06 | Stop reason: HOSPADM

## 2025-03-06 RX ORDER — SODIUM CHLORIDE 0.9 % (FLUSH) 0.9 %
10 SYRINGE (ML) INJECTION EVERY 12 HOURS SCHEDULED
Status: DISCONTINUED | OUTPATIENT
Start: 2025-03-06 | End: 2025-03-06 | Stop reason: HOSPADM

## 2025-03-06 RX ORDER — IBUPROFEN 600 MG/1
600 TABLET, FILM COATED ORAL EVERY 6 HOURS SCHEDULED
Status: DISCONTINUED | OUTPATIENT
Start: 2025-03-06 | End: 2025-03-06

## 2025-03-06 RX ORDER — ROPIVACAINE HYDROCHLORIDE 2 MG/ML
15 INJECTION, SOLUTION EPIDURAL; INFILTRATION; PERINEURAL CONTINUOUS
Status: DISCONTINUED | OUTPATIENT
Start: 2025-03-06 | End: 2025-03-08 | Stop reason: HOSPADM

## 2025-03-06 RX ORDER — ACETAMINOPHEN 325 MG/1
650 TABLET ORAL EVERY 6 HOURS PRN
Status: DISCONTINUED | OUTPATIENT
Start: 2025-03-06 | End: 2025-03-08 | Stop reason: HOSPADM

## 2025-03-06 RX ORDER — HYDROCODONE BITARTRATE AND ACETAMINOPHEN 5; 325 MG/1; MG/1
1 TABLET ORAL EVERY 4 HOURS PRN
Status: DISCONTINUED | OUTPATIENT
Start: 2025-03-06 | End: 2025-03-08 | Stop reason: HOSPADM

## 2025-03-06 RX ORDER — EPHEDRINE SULFATE 5 MG/ML
INJECTION INTRAVENOUS
Status: DISCONTINUED
Start: 2025-03-06 | End: 2025-03-08 | Stop reason: HOSPADM

## 2025-03-06 RX ADMIN — SODIUM CHLORIDE, SODIUM LACTATE, POTASSIUM CHLORIDE, CALCIUM CHLORIDE 1000 ML: 20; 30; 600; 310 INJECTION, SOLUTION INTRAVENOUS at 09:42

## 2025-03-06 RX ADMIN — DOCUSATE SODIUM 100 MG: 100 CAPSULE, LIQUID FILLED ORAL at 23:02

## 2025-03-06 RX ADMIN — ROPIVACAINE HYDROCHLORIDE 10 ML: 5 INJECTION, SOLUTION EPIDURAL; INFILTRATION; PERINEURAL at 10:15

## 2025-03-06 RX ADMIN — IBUPROFEN 600 MG: 600 TABLET, FILM COATED ORAL at 19:57

## 2025-03-06 RX ADMIN — WITCH HAZEL 1 PAD: 500 SOLUTION RECTAL; TOPICAL at 23:01

## 2025-03-06 RX ADMIN — Medication 2 MILLI-UNITS/MIN: at 07:46

## 2025-03-06 RX ADMIN — LIDOCAINE HYDROCHLORIDE,EPINEPHRINE BITARTRATE 7 ML: 20; .005 INJECTION, SOLUTION EPIDURAL; INFILTRATION; INTRACAUDAL; PERINEURAL at 14:07

## 2025-03-06 RX ADMIN — SODIUM CHLORIDE, SODIUM LACTATE, POTASSIUM CHLORIDE, CALCIUM CHLORIDE 125 ML/HR: 20; 30; 600; 310 INJECTION, SOLUTION INTRAVENOUS at 07:46

## 2025-03-06 RX ADMIN — Medication 250 ML/HR: at 19:10

## 2025-03-06 RX ADMIN — ROPIVACAINE HYDROCHLORIDE 9 ML: 5 INJECTION, SOLUTION EPIDURAL; INFILTRATION; PERINEURAL at 15:10

## 2025-03-06 RX ADMIN — Medication 999 ML/HR: at 18:48

## 2025-03-06 RX ADMIN — SODIUM CHLORIDE, SODIUM LACTATE, POTASSIUM CHLORIDE, CALCIUM CHLORIDE 125 ML/HR: 20; 30; 600; 310 INJECTION, SOLUTION INTRAVENOUS at 15:05

## 2025-03-06 RX ADMIN — Medication: at 23:01

## 2025-03-06 RX ADMIN — ROPIVACAINE HYDROCHLORIDE 13 ML/HR: 2 INJECTION, SOLUTION EPIDURAL; INFILTRATION at 10:18

## 2025-03-06 RX ADMIN — LIDOCAINE HYDROCHLORIDE AND EPINEPHRINE 3 ML: 15; 5 INJECTION, SOLUTION EPIDURAL at 10:08

## 2025-03-06 RX ADMIN — Medication 1 APPLICATION: at 23:01

## 2025-03-06 RX ADMIN — LIDOCAINE HYDROCHLORIDE,EPINEPHRINE BITARTRATE 7 ML: 20; .005 INJECTION, SOLUTION EPIDURAL; INFILTRATION; INTRACAUDAL; PERINEURAL at 11:42

## 2025-03-06 RX ADMIN — LIDOCAINE HYDROCHLORIDE AND EPINEPHRINE 3 ML: 15; 5 INJECTION, SOLUTION EPIDURAL at 15:07

## 2025-03-06 RX ADMIN — FENTANYL CITRATE 100 MCG: 50 INJECTION, SOLUTION INTRAMUSCULAR; INTRAVENOUS at 10:11

## 2025-03-06 RX ADMIN — Medication 10 ML: at 07:40

## 2025-03-06 NOTE — ANESTHESIA PREPROCEDURE EVALUATION
Anesthesia Evaluation     Patient summary reviewed and Nursing notes reviewed   NPO Solid Status: > 6 hours  NPO Liquid Status: < 2 hours           Airway   Mallampati: II  TM distance: >3 FB  Neck ROM: full  Dental      Pulmonary - negative pulmonary ROS   Cardiovascular - negative cardio ROS        Neuro/Psych  (+) seizures, psychiatric history Anxiety, Depression and ADHD  GI/Hepatic/Renal/Endo    (+) GERD, thyroid problem thyroid nodules    Musculoskeletal (-) negative ROS    Abdominal    Substance History - negative use     OB/GYN    (+) Pregnant        Other - negative ROS                   Anesthesia Plan    ASA 2     epidural       Anesthetic plan, risks, benefits, and alternatives have been provided, discussed and informed consent has been obtained with: patient.    Plan discussed with attending.    CODE STATUS:    Level Of Support Discussed With: Patient  Code Status (Patient has no pulse and is not breathing): CPR (Attempt to Resuscitate)  Medical Interventions (Patient has pulse or is breathing): Full Support

## 2025-03-06 NOTE — H&P
[facility]  Obstetric History and Physical    Chief Complaint   Patient presents with    Scheduled Induction       Subjective     Patient is a 29 y.o. female  currently at 39w3d, who presents with pregnancy for elective induction of labor.    Her prenatal care is benign.  Her previous obstetric/gynecological history is noted for is non-contributory.    The following portions of the patients history were reviewed and updated as appropriate: current medications .       Prenatal Information:  Prenatal Results       Initial Prenatal Labs       Test Value Reference Range Date Time    Hemoglobin  12.2 g/dL 11.1 - 15.9 08/15/24 1407    Hematocrit  40.2 % 34.0 - 46.6 08/15/24 1407    Platelets  377 x10E3/uL 150 - 450 08/15/24 1407    Rubella IgG  3.89 index Immune >0.99 08/15/24 140    Hepatitis B SAg  Negative  Negative 08/15/24 1407    Hepatitis C Ab  Non Reactive  Non Reactive 08/15/24 1407    RPR  Non Reactive  Non Reactive 24 1218       Non Reactive  Non Reactive 08/15/24 1407    T. Pallidum Ab         ABO  O   08/15/24 1407    Rh  Positive   08/15/24 1407    Antibody Screen  Negative  Negative 08/15/24 1407    HIV  Non Reactive  Non Reactive 08/15/24 1407    Urine Culture  >100,000 CFU/mL Normal Urogenital Jennie   24 0411       50,000 CFU/mL Normal Urogenital Jennie   24 1108       Final report   08/15/24 1407    Gonorrhea        Chlamydia        TSH        HgB A1c         Varicella IgG        Hemoglobinopathy Fractionation        Hemoglobinopathy (genetic testing)        Cystic fibrosis         Spinal muscular atrophy        Fragile X                  Fetal testing        Test Value Reference Range Date Time    NIPT        MSAFP        AFP-4                  2nd and 3rd Trimester       Test Value Reference Range Date Time    Hemoglobin (repeated)  10.8 g/dL 12.0 - 15.9 25 0629       11.1 g/dL 12.0 - 15.9 24 1218    Hematocrit (repeated)  33.8 % 34.0 - 46.6 25 0629        33.4 % 34.0 - 46.6 12/19/24 1218    Platelets   279 10*3/mm3 140 - 450 03/06/25 0629       270 10*3/mm3 140 - 450 12/19/24 1218       377 x10E3/uL 150 - 450 08/15/24 1407    1 hour GTT   113 mg/dL 65 - 139 12/19/24 1218    Antibody Screen (repeated)  Negative  Negative 12/19/24 1218    3rd TM syphilis scrn (repeated)  RPR   Non Reactive  Non Reactive 12/19/24 1218    3rd TM syphilis scrn (repeated) TP-Ab        3rd TM syphilis screen TB-Ab (FTA)        Syphilis cascade test TP-Ab (EIA)        Syphilis cascade TPPA        GTT Fasting        GTT 1 Hr        GTT 2 Hr        GTT 3 Hr        Group B Strep  No Group B Streptococcus Isolated at 2 days   02/13/25 1844              Other testing        Test Value Reference Range Date Time    Parvo IgG         CMV IgG                   Drug Screening       Test Value Reference Range Date Time    Amphetamine Screen  Negative  Negative 12/25/24 0411       Negative ng/mL Lfvcav=9473 08/15/24 1407    Barbiturate Screen  Negative  Negative 12/25/24 0411       Negative ng/mL Lctmyu=054 08/15/24 1407    Benzodiazepine Screen  Negative  Negative 12/25/24 0411       Negative ng/mL Jnczws=620 08/15/24 1407    Methadone Screen  Negative  Negative 12/25/24 0411       Negative ng/mL Hrudqd=763 08/15/24 1407    Phencyclidine Screen  Negative  Negative 12/25/24 0411       Negative ng/mL Cutoff=25 08/15/24 1407    Opiates Screen  Negative  Negative 12/25/24 0411       Negative ng/mL Rkusaq=963 08/15/24 1407    THC Screen  Negative  Negative 12/25/24 0411       Negative ng/mL Cutoff=20 08/15/24 1407    Cocaine Screen  Negative  Negative 12/25/24 0411       Negative ng/mL Vxlwad=158 08/15/24 1407    Propoxyphene Screen  Negative ng/mL Hhygcm=617 08/15/24 1407    Buprenorphine Screen  Negative  Negative 12/25/24 0411    Methamphetamine Screen  Negative  Negative 12/25/24 0411    Oxycodone Screen  Negative  Negative 12/25/24 0411    Tricyclic Antidepressants Screen  Negative  Negative 12/25/24  0411              Legend    ^: Historical                          External Prenatal Results       Pregnancy Outside Results - Transcribed From Office Records - See Scanned Records For Details       Test Value Date Time    ABO  O  08/15/24 1407    Rh  Positive  08/15/24 1407    Antibody Screen  Negative  12/19/24 1218       Negative  08/15/24 1407    Varicella IgG       Rubella  3.89 index 08/15/24 1407    Hgb  10.8 g/dL 03/06/25 0629       11.1 g/dL 12/19/24 1218       12.2 g/dL 08/15/24 1407    Hct  33.8 % 03/06/25 0629       33.4 % 12/19/24 1218       40.2 % 08/15/24 1407    HgB A1c        1h GTT  113 mg/dL 12/19/24 1218    3h GTT Fasting       3h GTT 1 hour       3h GTT 2 hour       3h GTT 3 hour        Gonorrhea (discrete)       Chlamydia (discrete)       RPR  Non Reactive  12/19/24 1218       Non Reactive  08/15/24 1407    Syphils cascade: TP-Ab (FTA)       TP-Ab       TP-Ab (EIA)       TPPA       HBsAg  Negative  08/15/24 1407    Herpes Simplex Virus PCR       Herpes Simplex VIrus Culture       HIV  Non Reactive  08/15/24 1407    Hep C RNA Quant PCR       Hep C Antibody  Non Reactive  08/15/24 1407    AFP       NIPT       Cystic Fibrosis (Jose R)       Cystic Fibroisis        Spinal Muscular atrophy       Fragile X       Group B Strep  No Group B Streptococcus Isolated at 2 days  02/13/25 1844    GBS Susceptibility to Clindamycin       GBS Susceptibility to Erythromycin       Fetal Fibronectin       Genetic Testing, Maternal Blood                 Drug Screening       Test Value Date Time    Urine Drug Screen       Amphetamine Screen  Negative  12/25/24 0411       Negative ng/mL 08/15/24 1407    Barbiturate Screen  Negative  12/25/24 0411       Negative ng/mL 08/15/24 1407    Benzodiazepine Screen  Negative  12/25/24 0411       Negative ng/mL 08/15/24 1407    Methadone Screen  Negative  12/25/24 0411       Negative ng/mL 08/15/24 1407    Phencyclidine Screen  Negative  12/25/24 0411       Negative ng/mL  "08/15/24 1407    Opiates Screen  Negative  24 0411    THC Screen  Negative  24 0411    Cocaine Screen       Propoxyphene Screen  Negative ng/mL 08/15/24 1407    Buprenorphine Screen  Negative  24 0411    Methamphetamine Screen       Oxycodone Screen  Negative  24 0411    Tricyclic Antidepressants Screen  Negative  24              Legend    ^: Historical                             Past OB History:     OB History    Para Term  AB Living   4 1 0 1 2 1   SAB IAB Ectopic Molar Multiple Live Births   0 2 0 0 0 1      # Outcome Date GA Lbr Srinivasa/2nd Weight Sex Type Anes PTL Lv   4 Current            3 IAB 22           2 IAB 20           1  17 36w0d  2892 g (6 lb 6 oz) M Vag-Spont  N NOEMY       Past Medical History: Past Medical History:   Diagnosis Date    ADHD     Anxiety     Bipolar disorder     Depression     GERD (gastroesophageal reflux disease)     History of drug overdose 2024    cocaine and xanax    History of palpitations     History of syphilis 2023    History of thyroid cyst 2022    \"She has tiny thyroid cysts. These are too small to cause symptoms.\"    Kidney infection     Kidney stones     Ventricular premature beats       Past Surgical History Past Surgical History:   Procedure Laterality Date    ADENOIDECTOMY      CHOLECYSTECTOMY      TONSILLECTOMY      WISDOM TOOTH EXTRACTION        Family History: Family History   Problem Relation Age of Onset    Sleep apnea Father     No Known Problems Brother     No Known Problems Sister     No Known Problems Paternal Grandfather     No Known Problems Paternal Grandmother     Atrial fibrillation Maternal Grandmother     Skin cancer Maternal Grandmother     Diabetes Maternal Grandfather     Hypertension Maternal Grandfather     Stroke Maternal Grandfather     Heart attack Maternal Grandfather     Ovarian cancer Maternal Aunt     Breast cancer Maternal Aunt     Uterine cancer Neg Hx     Colon " "cancer Neg Hx       Social History:  reports that she has been smoking cigarettes. She has a 2.5 pack-year smoking history. She has been exposed to tobacco smoke. She has never used smokeless tobacco.   reports that she does not currently use alcohol.   reports that she does not currently use drugs after having used the following drugs: Marijuana and Cocaine(coke).        General ROS: Pertinent items are noted in HPI    Objective       Vital Signs Range for the last 24 hours  Temperature: Temp:  [98.1 °F (36.7 °C)-98.5 °F (36.9 °C)] 98.5 °F (36.9 °C)   Temp Source: Temp src: Oral   BP: BP: (108-114)/(57-66) 109/57   Pulse: Heart Rate:  [67-77] 67   Respirations: Resp:  [16-18] 16   SPO2:     O2 Amount (l/min):     O2 Devices Device (Oxygen Therapy): room air   Weight: Weight:  [78.9 kg (174 lb)] 78.9 kg (174 lb)     Physical Examination: General appearance - alert, well appearing, and in no distress    Presentation: Vertex   Cervix: Exam by:     Dilation:     Effacement:     Station:         Fetal Heart Rate Assessment   Method:     Beats/min:     Baseline:     Varibility:     Accels:     Decels:     Tracing Category:       Uterine Assessment   Method:     Frequency (min):     Ctx Count in 10 min:     Duration:     Intensity:     Intensity by IUPC:     Resting Tone:     Resting Tone by IUPC:     [place] Units:       Laboratory Results:   Radiology Review:   Other Studies:     Assessment & Plan       39 weeks gestation of pregnancy    Encounter for elective induction of labor        Assessment:  1.  Intrauterine pregnancy at 39w3d weeks gestation with reactive fetal status.    2.  Desires elective induction of labor  3.  Obstetrical history significant for is non-contributory.  4.  GBS status: No results found for: \"GBSANTIGEN\"    Plan:  1.  Pitocin induction of labor  2. Plan of care has been reviewed with patient and family  3.  Risks, benefits of treatment plan have been discussed.  4.  All questions have been " answered.  5.        Perfecto Lindsay MD  3/6/2025  08:57 EST

## 2025-03-06 NOTE — PROGRESS NOTES
Patient is a positive treponemal syphilis serology test.  RPR still pending.  Patient reports syphilitic infection a year ago which was adequately treated with Angélica penicillin.  Patient had RPR in December 2 months ago that was negative.  Should the RPR be negative then this is a old infection that has been successfully treated as the positive treatment syphilis serology remains positive for life sometimes.

## 2025-03-06 NOTE — ANESTHESIA PROCEDURE NOTES
Labor Epidural      Patient reassessed immediately prior to procedure    Patient location during procedure: OB  Start Time: 3/6/2025 2:57 PM  Performed By  CRNA/CAA: Rossana Guan CRNA  Preanesthetic Checklist  Completed: patient identified, IV checked, risks and benefits discussed, surgical consent, monitors and equipment checked, pre-op evaluation and timeout performed  Prep:  Pt Position:sitting  Sterile Tech:cap, gloves, mask and sterile barrier  Prep:DuraPrep  Monitoring:blood pressure monitoring  Epidural Block Procedure:  Approach:midline  Guidance:palpation technique  Location:L2-L3  Needle Type:Tuohy  Needle Gauge:17 G  Loss of Resistance Medium: saline  Loss of Resistance: 7cm  Cath Depth at skin:13 cm  Paresthesia: none  Aspiration:negative  Test Dose:negative  Number of Attempts: 1  Post Assessment:  Dressing:occlusive dressing applied and secured with tape  Pt Tolerance:patient tolerated the procedure well with no apparent complications  Complications:no

## 2025-03-06 NOTE — ANESTHESIA PROCEDURE NOTES
Labor Epidural      Patient reassessed immediately prior to procedure    Patient location during procedure: floor  Performed By  CRNA/BRANDON: Sintia Escobedo CRNA  Preanesthetic Checklist  Completed: patient identified, IV checked, site marked, risks and benefits discussed, surgical consent, monitors and equipment checked, pre-op evaluation and timeout performed  Prep:  Pt Position:sitting  Sterile Tech:cap, gloves, mask and sterile barrier  Prep:DuraPrep  Monitoring:blood pressure monitoring  Epidural Block Procedure:  Approach:midline  Guidance:landmark technique and palpation technique  Location:L3-L4  Needle Type:Tuohy  Needle Gauge:17 G  Loss of Resistance Medium: air  Loss of Resistance: 5cm  Cath Depth at skin:10 cm  Paresthesia: none  Aspiration:negative  Test Dose:negative  Number of Attempts: 1  Post Assessment:  Dressing:occlusive dressing applied and secured with tape  Pt Tolerance:patient tolerated the procedure well with no apparent complications  Complications:no

## 2025-03-07 ENCOUNTER — PATIENT OUTREACH (OUTPATIENT)
Dept: LABOR AND DELIVERY | Facility: HOSPITAL | Age: 30
End: 2025-03-07
Payer: COMMERCIAL

## 2025-03-07 LAB
BASOPHILS # BLD AUTO: 0.07 10*3/MM3 (ref 0–0.2)
BASOPHILS NFR BLD AUTO: 0.4 % (ref 0–1.5)
DEPRECATED RDW RBC AUTO: 43.9 FL (ref 37–54)
EOSINOPHIL # BLD AUTO: 0.13 10*3/MM3 (ref 0–0.4)
EOSINOPHIL NFR BLD AUTO: 0.8 % (ref 0.3–6.2)
ERYTHROCYTE [DISTWIDTH] IN BLOOD BY AUTOMATED COUNT: 15.6 % (ref 12.3–15.4)
HCT VFR BLD AUTO: 33.1 % (ref 34–46.6)
HGB BLD-MCNC: 10.5 G/DL (ref 12–15.9)
IMM GRANULOCYTES # BLD AUTO: 0.14 10*3/MM3 (ref 0–0.05)
IMM GRANULOCYTES NFR BLD AUTO: 0.8 % (ref 0–0.5)
LYMPHOCYTES # BLD AUTO: 2.48 10*3/MM3 (ref 0.7–3.1)
LYMPHOCYTES NFR BLD AUTO: 14.6 % (ref 19.6–45.3)
MCH RBC QN AUTO: 24.9 PG (ref 26.6–33)
MCHC RBC AUTO-ENTMCNC: 31.7 G/DL (ref 31.5–35.7)
MCV RBC AUTO: 78.6 FL (ref 79–97)
MONOCYTES # BLD AUTO: 0.84 10*3/MM3 (ref 0.1–0.9)
MONOCYTES NFR BLD AUTO: 4.9 % (ref 5–12)
NEUTROPHILS NFR BLD AUTO: 13.35 10*3/MM3 (ref 1.7–7)
NEUTROPHILS NFR BLD AUTO: 78.5 % (ref 42.7–76)
NRBC BLD AUTO-RTO: 0 /100 WBC (ref 0–0.2)
PLATELET # BLD AUTO: 284 10*3/MM3 (ref 140–450)
PMV BLD AUTO: 12.8 FL (ref 6–12)
RBC # BLD AUTO: 4.21 10*6/MM3 (ref 3.77–5.28)
WBC NRBC COR # BLD AUTO: 17.01 10*3/MM3 (ref 3.4–10.8)

## 2025-03-07 PROCEDURE — 85025 COMPLETE CBC W/AUTO DIFF WBC: CPT | Performed by: OBSTETRICS & GYNECOLOGY

## 2025-03-07 RX ORDER — CITRIC ACID/SODIUM CITRATE 334-500MG
SOLUTION, ORAL ORAL
Status: COMPLETED
Start: 2025-03-07 | End: 2025-03-07

## 2025-03-07 RX ORDER — CITRIC ACID/SODIUM CITRATE 334-500MG
30 SOLUTION, ORAL ORAL ONCE
Status: DISCONTINUED | OUTPATIENT
Start: 2025-03-07 | End: 2025-03-08 | Stop reason: HOSPADM

## 2025-03-07 RX ADMIN — IBUPROFEN 600 MG: 600 TABLET, FILM COATED ORAL at 04:00

## 2025-03-07 RX ADMIN — IBUPROFEN 600 MG: 600 TABLET, FILM COATED ORAL at 18:36

## 2025-03-07 RX ADMIN — SODIUM CITRATE AND CITRIC ACID MONOHYDRATE 30 ML: 500; 334 SOLUTION ORAL at 05:22

## 2025-03-07 RX ADMIN — ACETAMINOPHEN 650 MG: 325 TABLET, FILM COATED ORAL at 14:03

## 2025-03-07 RX ADMIN — IBUPROFEN 600 MG: 600 TABLET, FILM COATED ORAL at 11:12

## 2025-03-07 RX ADMIN — DOCUSATE SODIUM 100 MG: 100 CAPSULE, LIQUID FILLED ORAL at 08:14

## 2025-03-07 NOTE — ANESTHESIA POSTPROCEDURE EVALUATION
Patient: Silvia Bravo    Procedure Summary       Date: 03/06/25 Room / Location:     Anesthesia Start: 0959 Anesthesia Stop: 1848    Procedure: LABOR ANALGESIA Diagnosis:     Scheduled Providers:  Provider: Sandhya Kurtz DO    Anesthesia Type: epidural ASA Status: 2            Anesthesia Type: epidural    Vitals  Vitals Value Taken Time   /60 03/07/25 0826   Temp 98.6 °F (37 °C) 03/07/25 0826   Pulse 75 03/07/25 0826   Resp 16 03/07/25 0826   SpO2             Post Anesthesia Care and Evaluation    Patient location during evaluation: bedside  Patient participation: complete - patient participated  Level of consciousness: awake and alert  Pain management: adequate    Airway patency: patent  Anesthetic complications: No anesthetic complications    Cardiovascular status: acceptable  Respiratory status: acceptable  Hydration status: acceptable  Post Neuraxial Block status: Motor and sensory function returned to baseline and No signs or symptoms of PDPH

## 2025-03-07 NOTE — OUTREACH NOTE
[facility]  IP Postpartum    Questions/Answers      Flowsheet Row Responses   Best Method for Contacting Cell   Support Person Present No   Does the patient have a car seat at the hospital No   Delivery Note Reviewed Reviewed   Were birth expectations met? Yes   Is there a need for additional support/resources? No   Lactation Note Reviewed Reviewed   Is additional support needed? No   Any questions or concerns? No   Is the patient going to use Meds to Beds? Yes   Any concerns related discharge meds/ability to  prescriptions? No   Confirm Postpartum OB appointment No  [Plans f/u with Ang[facility]lica]   Confirm initial well-child Pediatrician appointment date/time: No  [Undecided on F/U provider]   Additional post-discharge F/U appointments No   Does patient have transportation to appointments? Yes   Any other assistance needed to ensure she is able to attend appointments? No   Does patient have supplies needed at home for  care? Breast Pump, Clothing, Crib, Diapers, Formula          Feeling well after delivery. Provided with outpatient lactation clinic contact information for breastfeeding or pumping assistance after discharge. Signs and symptoms of preeclampsia reviewed. VU. Provided with POST Birth warning signs flyer from [employer]. Encouraged to call with questions, concerns, or for support. Contact information provided. Will f/u 3/17/25.    Monique Salcido RN  Maternity Nurse Navigator    3/7/2025, 11:04 EST

## 2025-03-07 NOTE — PROGRESS NOTES
3/7/2025  PPD #1    Subjective   Silvia feels well.  Patient describes her lochia less than menses.  Pain is well controlled       Objective   Temp: Temp:  [98.1 °F (36.7 °C)-99.4 °F (37.4 °C)] 98.1 °F (36.7 °C) Temp src: Oral   BP: BP: ()/(49-84) 97/51        Pulse: Heart Rate:  [67-96] 73  RR: Resp:  [16-18] 18    General:  No acute distress   Abdomen: Fundus firm and beneath umbilicus   Pelvis: deferred     Lab Results   Component Value Date    WBC 11.87 (H) 03/06/2025    HGB 10.8 (L) 03/06/2025    HCT 33.8 (L) 03/06/2025    MCV 77.5 (L) 03/06/2025     03/06/2025    ABORH O Positive 01/28/2022    HEPBSAG Negative 08/15/2024     Results from last 7 days   Lab Units 03/06/25  0629   RPR SCREEN  Non-Reactive   TREPONEMA PALLIDUM AB TOTAL  Reactive*     Assessment  PPD# 1 after vaginal delivery, doing well; am labs pending  Hx of treatment for syphilis--- RPR negative, antibodies negative    Plan  Routine postpartum care.      This note has been electronically signed.    Jade Moser, APRN  March 7, 2025

## 2025-03-07 NOTE — LACTATION NOTE
25 0030   Maternal Information   Date of Referral 25   Person Making Referral lactation consultant  (courtesy visit, newly postpartum)   Maternal Reason for Referral breastfeeding currently   Infant Reason for Referral  infant   Milk Expression/Equipment   Breast Pump Type double electric, personal  (Medela wearables)     Courtesy visit to newly postpartum couplet. Mom states she breast fed #1 x 2 months. She reports infant is latching well so far. She does not want to wake infant to latch at this time. Reviewed breast feeding tips and information handouts with Mom. She verbalized understanding. She has a Medela wearable pump and brought it with her. Wash basin, soap, and oral syringes provided. Encouraged to call lactation for latch assist/check during a feed. Mom verbalized understanding.

## 2025-03-07 NOTE — L&D DELIVERY NOTE
" [facility]   Vaginal Delivery Note    Patient Name: Silvia Bravo  : 1995  MRN: [id number]    Date of Delivery: 3/6/2025    Diagnosis     Pre & Post-Delivery:  Intrauterine pregnancy at 39w3d  Labor status: Induced Onset of Labor    Currently pregnant    39 weeks gestation of pregnancy    Encounter for elective induction of labor             Problem List    Transfer to Postpartum     Review the Delivery Report for details.     Delivery     Delivery: Vaginal, Spontaneous    YOB: 2023   Time of Birth:  Gestational Age 6:39 PM  39w3d     Anesthesia: Epidural    Delivering clinician: Perfecto Lindsay   Forceps?   No   Vacuum? No    Shoulder dystocia present: No        Delivery narrative: Patient pushed for 10 minutes and delivered vertex vaginally over no episiotomy under epidural a female.  Baby suction cried Cord milked cord cut and clamped after being delayed.  Cord blood obtained placenta expressed uterus explored.  There was a first-degree tear 1 cm long repaired with 3-0 chromic interrupted suture.  EBL was 150      Infant     Findings: female infant     Infant observations: Weight: 3425 g (7 lb 8.8 oz)  Length: 20 in  Observations/Comments:        Apgars: 8  @ 1 minute /    9  @ 5 minutes   Infant Name:      Placenta & Cord         Placenta delivered  Spontaneous at   3/6/2025  6:48 PM    Cord: 3 vessels present.   Nuchal Cord?  no   Cord blood obtained: Yes   Cord gases obtained:  No   Cord gas results: Venous:  No results found for: \"PHCVEN\", \"BECVEN\"    Arterial:  No results found for: \"PHCART\", \"BECART\"     Repair     Episiotomy: Not recorded    No    Lacerations: Yes  Laceration Information  Laceration Repaired?   Perineal: 1st Yes   Periurethral:       Labial:       Sulcus:       Vaginal:       Cervical:         Suture used for repair: 3-0 chromic gut  Laceration Length for 3rd or 4th degree lacerations: 1 cm first-degree tear repaired with 3-0 chromic.  Cm         Estimated " Blood Loss:  150     Quantitative Blood Loss: Quantitative Blood Loss (mL): 200 mL (03/06/25 1900)        Complications     none    Disposition     Mother to 150  in stable condition currently.  Baby to NBN  in stable condition currently.    Perfecto Lindsay MD  03/06/25  21:30 EST

## 2025-03-08 VITALS
SYSTOLIC BLOOD PRESSURE: 94 MMHG | TEMPERATURE: 98.7 F | RESPIRATION RATE: 16 BRPM | HEIGHT: 61 IN | BODY MASS INDEX: 32.85 KG/M2 | WEIGHT: 174 LBS | HEART RATE: 77 BPM | DIASTOLIC BLOOD PRESSURE: 50 MMHG

## 2025-03-08 RX ADMIN — ACETAMINOPHEN 650 MG: 325 TABLET, FILM COATED ORAL at 08:52

## 2025-03-08 RX ADMIN — IBUPROFEN 600 MG: 600 TABLET, FILM COATED ORAL at 11:09

## 2025-03-08 RX ADMIN — IBUPROFEN 600 MG: 600 TABLET, FILM COATED ORAL at 03:47

## 2025-03-08 RX ADMIN — DOCUSATE SODIUM 100 MG: 100 CAPSULE, LIQUID FILLED ORAL at 08:52

## 2025-03-08 NOTE — PLAN OF CARE
Goal Outcome Evaluation:  Vitals within normal, tolerating PO, voiding w/out difficulty, light lochia, no s/s of infection.

## 2025-03-08 NOTE — DISCHARGE SUMMARY
Discharge Summary    Date of Admission: 3/6/2025  Date of Discharge:  3/8/2025      Patient: Silvia Bravo      MR#:[id number]    Delivery Provider: Perfecto Lindsay    Attending Provider: Perfecto Lindsay MD    Presenting Problem/History of Present Illness  Currently pregnant [Z34.90]       Currently pregnant    39 weeks gestation of pregnancy    Encounter for elective induction of labor         Discharge Diagnosis: Vaginal delivery at 39w3d    Procedures:  Vaginal, Spontaneous    3/6/2025   6:39 PM       Discharge Date: 3/8/2025;     Hospital Course  Patient is a 29 y.o. female  at 39w3d status post vaginal delivery without complication. Postpartum the patient did well. She remained afebrile, with vital signs stable. She was ready for discharge on postpartum day 2.     Infant:   female fetus 3425 g (7 lb 8.8 oz) with Apgar scores of 8 , 9  at five minutes.    Condition on Discharge:  Stable    Vital Signs  Temp:  [98.7 °F (37.1 °C)-98.9 °F (37.2 °C)] 98.7 °F (37.1 °C)  Heart Rate:  [71-93] 77  Resp:  [16-18] 16  BP: ()/(50-57) 94/50    Lab Results   Component Value Date    WBC 17.01 (H) 2025    HGB 10.5 (L) 2025    HCT 33.1 (L) 2025    MCV 78.6 (L) 2025     2025       Discharge Disposition  Home or Self Care    Discharge Medications     Discharge Medications        Continue These Medications        Instructions Start Date   Prenatal Multivitamin + DHA 28-0.8 & 200 MG misc   1 tablet, Oral, Daily             Stop These Medications      Acetaminophen Extra Strength 500 MG tablet     calcium carbonate 500 MG chewable tablet  Commonly known as: TUMS     lansoprazole 30 MG capsule  Commonly known as: Prevacid              Discharge Diet:     Activity at Discharge:   Activity Instructions       Sexual Activity Restrictions      Type of Restriction: Sex    Explain Sexual Activity Restrictions: nothing in the vagina for 6 weeks    Work Restrictions      Type of  Restriction: Work    May Return to Work: After Next Appointment    With / Without Restrictions: Without Restrictions            Follow-up Appointments  No future appointments.  Additional Instructions for the Follow-ups that You Need to Schedule       Call MD With Problems / Concerns   As directed      Instructions: Call for temp >/= 100.4, severe pain, severe bleeding, headache that does not improve with rest, medication, blurred vision, or postpartum depression. Monitor incision for signs of infection including, foul odor, discharge or separation of the wound.  Call for blood clots larger than a golf ball or saturating a pad in less than an hour.    Order Comments: Instructions: Call for temp >/= 100.4, severe pain, severe bleeding, headache that does not improve with rest, medication, blurred vision, or postpartum depression. Monitor incision for signs of infection including, foul odor, discharge or separation of the wound.  Call for blood clots larger than a golf ball or saturating a pad in less than an hour.         Discharge Follow-up with Specified Provider: Dr. Lindsay; 6 Weeks   As directed      To: Dr. Lindsay   Follow Up: 6 Weeks                ROSELINE Crum  03/08/25  10:29 EST  Csd

## 2025-03-10 LAB — T PALLIDUM AB SER QL AGGL: REACTIVE

## 2025-03-18 ENCOUNTER — PATIENT OUTREACH (OUTPATIENT)
Dept: LABOR AND DELIVERY | Facility: HOSPITAL | Age: 30
End: 2025-03-18
Payer: COMMERCIAL

## 2025-03-18 NOTE — OUTREACH NOTE
Motherhood Connection  Postpartum Check-In    Questions/Answers      Flowsheet Row Responses   Visit Setting Telephone   Best Method for Contacting Cell   OB Discharge Note Reviewed  Reviewed   OB Discharge Medications Reviewed  Reviewed   [place] discharged home with mother? Yes   Current Pain Levels 0-10 1   At Rest Pain Levels 0-10 1   Pain level with activity 0-10 1   Acceptable Pain Level 0-10 3   Pain Location Back   Pain Description Aching   Verbalized Emotional State Acceptance   Family/Support Network Grandparent, Mother, Family   Level of Involvement in Care Attentive, Supportive   Do you feel comfortable in your relationship with your baby? Yes   Have members of your household adjusted to your baby? Yes   Is the baby's father supportive and/or involved with the baby? Other   Comment FOB currently in rehab, plans to be involved with NB when graduates his program.   Do you feel safe at home, school and work? Yes   Are you in a relationship with someone who threatens you or hurts you? No   Do you have the resources to keep yourself and your baby healthy and safe? Yes   Lochia (per patient report) Brown-kera Red   Amount Scant   Number of pads per day 1   Lochia Odor None   Is patient breastfeeding? Yes, pumping   Breast Care Supportive Bra   pumping - Left Breast Nontender, Soft   Pumping - Right Breast Nontender, Soft   Pumping - Left Nipple Intact, Nontender   Pumping - Right Nipple Intact, Nontender   Frequency q3 hr   Pumping Quantity 180 ml   Storage of Milk freezer   Postpartum Depression Screening Education Education Provided   Doctor Appointments: Education Provided   Breastfeeding Education Education Provided   Postpartum Care Education Education Provided   S & S to report Education Provided   Followup Appointments Made No   Well Child Visit Appointments Made Yes   Well Child Checkup Provider Name [facility]   Well Child Check Up Date: 03/10/25   Did you complete the visit? Yes   Were  there any specific concerns? No   Umbilical Cord No reported signs or symptoms   Infant Feeding Method Formula, Expressed Breast Milk   Is a lactation referral indicated? No   Formula Type Other   Other Formula Similac Sensative   Formula PO (mL) 60-90 ml   Formula/Expressed Milk frequency of feedings: occasionally   Expressed milk PO (mL) 60-90 ml   Expressed milk- frequency of feedings q 3hrs   Number of wet diapers x 24 hours 10   Last BM x 24 hours 2   Emesis (Unmeasured Occurence) occ   What safe sleep surface is available? Bassinet   Are there stuffed animals, toys, pillows, quilts, blankets, wedges, positioners, bumpers or other loose bedding in the infant's sleeping environment? No   Where does the baby usually sleep? Bassinet   Does the baby ever share a sleep surface with a sibling, adult or pet? No   Does the baby ever share a sleep surface in a bed, couch, recliner or other? No   What position do you place your baby to sleep for naps? Back   What position do you place your baby to sleep at night Back   Are you and/or other caregivers smoking inside or outside the baby's home? No   Is the infant dressed appropiately for the temperature of the home? Yes   Do you use a clean, dry pacifier that is not attached to a string or stuffed animal? Yes            Review of Systems   Genitourinary:  Positive for vaginal bleeding.        Scant rubra   All other systems reviewed and are negative.    Most Recent [place]  Depression Scale Score (EPDS)    Performed by a clinician: 3 (3/18/2025  1:28 PM)    5 Ps Screen  Completed    Feeling well since going home. Minimal pain and lochia WNL. States mood has been stable. Reports good family support. Reports pumping is going well and only occasionally supplementing with formula.     Baby doing well with no concerns at follow up appointment.     No community resource needs at present. Updated resources provided via [employer] message. RN from call center to f/u next  week. Contact information provided. Encouraged to call with questions, concerns, or for support before then.    Monique Salcido RN  Maternity Nurse Navigator    3/18/2025, 13:44 EDT

## 2025-03-20 ENCOUNTER — TELEPHONE (OUTPATIENT)
Dept: OBSTETRICS AND GYNECOLOGY | Facility: CLINIC | Age: 30
End: 2025-03-20
Payer: COMMERCIAL

## 2025-03-20 NOTE — TELEPHONE ENCOUNTER
"Provider:     Caller: Silvia Bravo \"Lisa\"    Relationship to Patient: Self    [facility]    Phone Number: [phone] CALL ANYTIME.     Reason for Call: VAGINAL DELIVERY 03/06/25. PATIENT HAS BEEN TRYING TO DRY UP MILK SUPPLY. BOTH BREAST ARE VERY TENDER, PAINFUL AND WARM TO THE TOUCH. HEADACHE STARTED LAST NIGHT. FEELS FEVERISH AND SWEATING. FEELS MORE FATIGUED THAN NORMAL.  PATIENT NOTICED VAGINAL ODOR AFTER DELIVERY. STILL HAS SAME VAGINAL ODOR WITH SOME ITCHING.    When was the patient last seen: 03/04/25    When did it start: TRIED TO STOP PUMPING YESTERDAY MORNING AND NOTICED BREAST CONCERNS YESTERDAY EVENING.     Characteristics of symptom/severity: BREAST AND HEADACHE ARE SEVERE    Timing- Is it constant or intermittent: CONSTANT     What makes it worse: NOT PUMPING    What makes it better: PUMPING RELIEVES SOME PAIN AND PRESSURE.    What therapies/medications have you tried: TYLENOL HELPS WITH BREAST PAIN BUT NOT HEADACHE.    PATIENT CALLED TO SCHEDULE SIX WEEK POSTPARTUM. HUB SCHEDULED FOR 04/17. PATIENT IS REQUESTING TO BE SEEN TODAY OR AS SOON AS POSSIBLE DUE TO BREAST CONCERNS. PATIENT WOULD LIKE TO GET A SHOT TO DRY UP MILK SUPPLY. HUB SPOKE WITH NON CLINICAL AND WAS ASKED TO SEND TELEPHONE ENCOUNTER.      "

## 2025-03-25 ENCOUNTER — PATIENT OUTREACH (OUTPATIENT)
Dept: CALL CENTER | Facility: HOSPITAL | Age: 30
End: 2025-03-25
Payer: COMMERCIAL

## 2025-03-25 NOTE — OUTREACH NOTE
Motherhood Connection Survey      Flowsheet Row Responses   List of hospitals in [place] patient discharged from? [place]   Week 1 attempt successful? Yes   Call start time 1543   Call end time 1550   Baby sex Girl   [place] discharged home with mother? Yes   Baby sex Girl   Delivery type Vaginal   Emotional state Acceptance   Family support Yes   Do you have all necessary resources to care for you and your baby?  Yes   Have members of your household adjusted to your baby? Yes   Did you have any problems with pre-eclampsia during this pregnancy? No   Do you have any of the following: No Issues   Did you have blood glucose issues during this pregnancy No   Lochia amount Light   Lochia per patient report Hay[place]   Additional comments Vaginal delivery- healing well   Feeding Method Bottle   Frequency Sim   Amount 2-3 ounces   Breast Condition Yes  [Patient stopped breastfeeding 5 days ago and is presently allowing milk to dry up. She is alittle sore but is using warm compresses for relief. No fevers or redness.]   Nipple Condition No   Signs baby is ready to eat Crying   Feeding tolerance Adequate pause for breath, Wet/dirty diapers   Number of wet diapers x 24 hours 10   Last BM x 24 hours 1-2   Umbilical Cord No reported signs or symptoms   Where does the baby usually sleep? Bassinet   Are there stuffed animals, toys, pillows, quilts, blankets, wedges, positioners, bumpers or other loose bedding in the infant's sleeping environment? No   Does the baby ever share a sleep surface in a bed, couch, recliner or other? No   What position do you lay your baby down to sleep? Back   Are you and/or other caregivers smoking inside or outside the baby's home? No   Mom appointment comments: Mom has upcoming appt   Baby appointment comments: Baby has been for followups x 3   Call completed? Yes              Lenny ASHLEY - Registered Nurse

## 2025-04-09 ENCOUNTER — LAB (OUTPATIENT)
Dept: LAB | Facility: HOSPITAL | Age: 30
End: 2025-04-09
Payer: COMMERCIAL

## 2025-04-09 ENCOUNTER — OFFICE VISIT (OUTPATIENT)
Dept: FAMILY MEDICINE CLINIC | Facility: CLINIC | Age: 30
End: 2025-04-09
Payer: COMMERCIAL

## 2025-04-09 VITALS
SYSTOLIC BLOOD PRESSURE: 117 MMHG | DIASTOLIC BLOOD PRESSURE: 77 MMHG | OXYGEN SATURATION: 98 % | HEIGHT: 61 IN | HEART RATE: 80 BPM | BODY MASS INDEX: 32.47 KG/M2 | WEIGHT: 172 LBS

## 2025-04-09 DIAGNOSIS — Z51.81 THERAPEUTIC DRUG MONITORING: ICD-10-CM

## 2025-04-09 DIAGNOSIS — F90.2 ATTENTION DEFICIT HYPERACTIVITY DISORDER (ADHD), COMBINED TYPE: Primary | ICD-10-CM

## 2025-04-09 DIAGNOSIS — L65.9 HAIR LOSS: ICD-10-CM

## 2025-04-09 DIAGNOSIS — L50.9 HIVES: ICD-10-CM

## 2025-04-09 LAB
ALBUMIN SERPL-MCNC: 4.4 G/DL (ref 3.5–5.2)
ALBUMIN/GLOB SERPL: 1.6 G/DL
ALP SERPL-CCNC: 86 U/L (ref 39–117)
ALT SERPL W P-5'-P-CCNC: 62 U/L (ref 1–33)
ANION GAP SERPL CALCULATED.3IONS-SCNC: 13.3 MMOL/L (ref 5–15)
AST SERPL-CCNC: 33 U/L (ref 1–32)
BILIRUB SERPL-MCNC: 0.3 MG/DL (ref 0–1.2)
BUN SERPL-MCNC: 8 MG/DL (ref 6–20)
BUN/CREAT SERPL: 12.1 (ref 7–25)
CALCIUM SPEC-SCNC: 9.3 MG/DL (ref 8.6–10.5)
CHLORIDE SERPL-SCNC: 101 MMOL/L (ref 98–107)
CO2 SERPL-SCNC: 24.7 MMOL/L (ref 22–29)
CREAT SERPL-MCNC: 0.66 MG/DL (ref 0.57–1)
EGFRCR SERPLBLD CKD-EPI 2021: 122.7 ML/MIN/1.73
GLOBULIN UR ELPH-MCNC: 2.7 GM/DL
GLUCOSE SERPL-MCNC: 83 MG/DL (ref 65–99)
IRON 24H UR-MRATE: 112 MCG/DL (ref 37–145)
IRON SATN MFR SERPL: 30 % (ref 20–50)
POTASSIUM SERPL-SCNC: 3.9 MMOL/L (ref 3.5–5.2)
PROT SERPL-MCNC: 7.1 G/DL (ref 6–8.5)
SODIUM SERPL-SCNC: 139 MMOL/L (ref 136–145)
TIBC SERPL-MCNC: 377 MCG/DL (ref 298–536)
TRANSFERRIN SERPL-MCNC: 253 MG/DL (ref 200–360)
TSH SERPL DL<=0.05 MIU/L-ACNC: 0.62 UIU/ML (ref 0.27–4.2)

## 2025-04-09 PROCEDURE — 84466 ASSAY OF TRANSFERRIN: CPT

## 2025-04-09 PROCEDURE — 80053 COMPREHEN METABOLIC PANEL: CPT

## 2025-04-09 PROCEDURE — 84443 ASSAY THYROID STIM HORMONE: CPT

## 2025-04-09 PROCEDURE — 83540 ASSAY OF IRON: CPT

## 2025-04-09 RX ORDER — CETIRIZINE HYDROCHLORIDE 10 MG/1
10 TABLET ORAL DAILY
Qty: 90 TABLET | Refills: 2 | Status: SHIPPED | OUTPATIENT
Start: 2025-04-09

## 2025-04-09 RX ORDER — LISDEXAMFETAMINE DIMESYLATE 10 MG/1
10 CAPSULE ORAL DAILY
Qty: 30 CAPSULE | Refills: 0 | Status: SHIPPED | OUTPATIENT
Start: 2025-04-09

## 2025-04-09 RX ORDER — BENZOCAINE/MENTHOL 6 MG-10 MG
1 LOZENGE MUCOUS MEMBRANE 2 TIMES DAILY
Qty: 60 G | Refills: 1 | Status: SHIPPED | OUTPATIENT
Start: 2025-04-09

## 2025-04-09 NOTE — PROGRESS NOTES
Silvia Bravo  1995  [id number]  Patient Care Team:  Mason Duarte MD as PCP - General (Internal Medicine)  Arpan Cornejo MD as Consulting Physician (Endocrinology)    Silvia Bravo is a 30 y.o. female here today for follow up.     This patient is accompanied by their self who contributes to the history of their care.    Chief Complaint:    Chief Complaint   Patient presents with    ADHD        History of Present Illness:  I have reviewed and/or updated the patient's past medical, past surgical, family, social history, problem list and allergies as appropriate.     She is here to talk about attention deficit. She was diagnosed with ADHD when she was 15 or 16. She didn't like the way the medication made her felt. She stopped taking the medication at 22 or 23. She currently works as [employer] and has issues  She has had 20 jobs and she is only 26. She would like to resume Vyvanse- lowest dose- struggling with multi tasking and task completion. Stopped breast feeding 2 weeks ago.. She has never had craving - preferred dose of vyvanse. Strattera wa snot tolerated secondary to smnelence. She currently attends meetings regulaly. Currently residing with her mother.  She has recently had medication of a couple of episodes of warm pruritic bumps on her extremities.  No insect bites.  History of eczema.  They remit spontaneously.  No shortness of breath no trouble swallowing.    Fortunately she has had difficulty with hair loss since her pregnancy and birth of her child.  She does take a prenatal.  She was iron deficient    Review of Systems   HENT: Negative.     Eyes: Negative.    Respiratory: Negative.     Musculoskeletal:         Hair loss     Skin:  Positive for rash.   Psychiatric/Behavioral:  Positive for decreased concentration. Negative for dysphoric mood, sleep disturbance, suicidal ideas, negative for hyperactivity, depressed mood and stress. The patient is not nervous/anxious.        Vitals:  "   04/09/25 1046   BP: 117/77   Pulse: 80   SpO2: 98%   Weight: 78 kg (172 lb)   Height: 154.9 cm (60.98\")     Body mass index is 32.52 kg/m².    Physical Exam  Vitals reviewed.   Constitutional:       Appearance: She is well-developed.   HENT:      Head: Normocephalic and atraumatic.   Eyes:      Conjunctiva/sclera: Conjunctivae normal.   Neck:      Vascular: No JVD.   Cardiovascular:      Rate and Rhythm: Normal rate and regular rhythm.      Heart sounds: Normal heart sounds. No murmur heard.     No friction rub. No gallop.   Pulmonary:      Effort: Pulmonary effort is normal. No respiratory distress.      Breath sounds: Normal breath sounds. No wheezing or rales.   Chest:      Chest wall: No tenderness.   Abdominal:      Palpations: Abdomen is soft.      Tenderness: There is no abdominal tenderness.   Musculoskeletal:         General: Normal range of motion.   Skin:     General: Skin is warm and dry.      Findings: Rash present.      Comments: There is a single wheal present on her distal right thigh.   Neurological:      Mental Status: She is alert and oriented to person, place, and time.   Psychiatric:         Mood and Affect: Mood normal.         Behavior: Behavior normal.         Procedures    Results Review:    None    Assessment/Plan:    Problem List Items Addressed This Visit       Attention deficit hyperactivity disorder (ADHD), combined type - Primary    Relevant Medications    lisdexamfetamine dimesylate (Vyvanse) 10 MG capsule    Other Relevant Orders    Ambulatory Referral to Behavioral Health     Other Visit Diagnoses         Hives        Relevant Medications    cetirizine (zyrTEC) 10 MG tablet    hydrocortisone 1 % cream      Hair loss        Relevant Orders    Iron Profile    Comprehensive Metabolic Panel    TSH Rfx On Abnormal To Free T4      Therapeutic drug monitoring        Relevant Orders    Compliance Drug Analysis, Ur - Urine, Clean Catch            Plan of care reviewed with patient at the " conclusion of today's visit. Education was provided regarding diagnosis and management.  Patient verbalizes understanding of and agreement with management plan.    Return in about 4 weeks (around 5/7/2025) for johnathan.    Mason Duarte MD      Please note than portions of this note were completed wt a Voice Recognition Program

## 2025-04-10 ENCOUNTER — PRIOR AUTHORIZATION (OUTPATIENT)
Dept: FAMILY MEDICINE CLINIC | Facility: CLINIC | Age: 30
End: 2025-04-10
Payer: COMMERCIAL

## 2025-04-10 NOTE — TELEPHONE ENCOUNTER
(Key: [id number])  Lisdexamfetamine Dimesylate 10MG capsules  status: Question Response - N/ACreated: April 9th, 2025 [phone]    Additional Information Required  The brand name, Vyvanse, is preferred for the member, but requires prior authorization.If the brand is acceptable, please resubmit the ePA under the brand name and continue through the criteria.If there is a clinical reason the patient cannot use the brand formulation (such as an allergy to an ingredient found in the brand that is not in the generic) then please fax the appropriate PA request along with the confirming documentation directly to [employer] at [phone].

## 2025-04-15 ENCOUNTER — TELEPHONE (OUTPATIENT)
Dept: OBSTETRICS AND GYNECOLOGY | Facility: CLINIC | Age: 30
End: 2025-04-15
Payer: COMMERCIAL

## 2025-04-15 LAB — DRUGS UR: NORMAL

## 2025-04-15 NOTE — TELEPHONE ENCOUNTER
Pt called request that the birth control that her and dr tripp discussed can be sent to her pharmacy today. Pt has an appt on 4.17.25

## 2025-04-15 NOTE — TELEPHONE ENCOUNTER
Patient of Dr. Lindsay; had  25. She is scheduled for PP visit in 2 days.   Returned patient's call.   States she wants RX for a vaginal birth control that she discussed with Dr. Lindsay before delivery.   Informed her that she can discuss with Dr. Lindsay at her appointment in 2 days and he can provide a Rx then. She v/u and agreed.

## 2025-04-17 ENCOUNTER — POSTPARTUM VISIT (OUTPATIENT)
Dept: OBSTETRICS AND GYNECOLOGY | Facility: CLINIC | Age: 30
End: 2025-04-17
Payer: COMMERCIAL

## 2025-04-17 VITALS — DIASTOLIC BLOOD PRESSURE: 68 MMHG | BODY MASS INDEX: 28.55 KG/M2 | WEIGHT: 151 LBS | SYSTOLIC BLOOD PRESSURE: 114 MMHG

## 2025-04-17 RX ORDER — LACTIC ACID, L-, CITRIC ACID MONOHYDRATE, AND POTASSIUM BITARTRATE 90; 50; 20 MG/5G; MG/5G; MG/5G
1 GEL VAGINAL ONCE AS NEEDED
Qty: 1 G | Refills: 4 | Status: SHIPPED | OUTPATIENT
Start: 2025-04-17

## 2025-04-17 NOTE — PROGRESS NOTES
Chief Complaint   Patient presents with    Postpartum Care       Postpartum Visit         Silvia Bravo is a 30 y.o.  who presents today for a 6 week(s) postpartum check.     C/S: no     Vaginal, Spontaneous   Information for the patient's :  Cara Bravo [[id number]]   3/6/2025   female   Cara Bravo   3425 g (7 lb 8.8 oz)   Gestational Age: 39w3d       Baby Discharged: Discharged with Mom  Delivering Physician: Perfecto Lindsay MD    Her pregnancy was complicated by no known issues. The laceration was 1st degree and is healing well. Patient describes vaginal bleeding as absent.  Patient is bottle feeding.  She desires to discuss options for birth control.     She would like to discuss the following complaints today: patient had unprotected intercourse on Monday.    Patient denies concerns for postpartum depression/anxiety. Patient denies  suicidal or homicidal ideation. Her postpartum depression screening questionnaire: 3. No treatment is indicated      Last Pap : 24. Results: negative. HPV: negative.   Last Completed Pap Smear            Upcoming       PAP SMEAR (Every 3 Years) Next due on 2024  LIQUID-BASED PAP SMEAR WITH HPV GENOTYPING REGARDLESS OF INTERPRETATION (CHAMP,COR,MAD)    2021  Patient-Reported (Performed Externally) - Dr. Orellana at [facility]                              The additional following portions of the patient's history were reviewed and updated as appropriate: allergies and current medications.    Review of Systems  All other systems reviewed and are negative.     I have reviewed and agree with the HPI, ROS, and historical information as entered above. Perfecto Lindsay MD      /68   Wt 68.5 kg (151 lb)   LMP 2024   Breastfeeding No   BMI 28.55 kg/m²     Physical Exam not done         Assessment and Plan    Problem List Items Addressed This Visit    None  Visit Diagnoses         6 weeks postpartum  follow-up    -  Primary    Relevant Medications    Lactic Ac-Citric Ac-Pot Bitart (Phexxi) 1.8-1-0.4 % gel            S/p Vaginal delivery, 6 week(s) postpartum.  Doing well.    Return to normal physical activity.  No pelvic restrictions.   Baby doing well.  Bottlefeeding going well.  Had unprotected IC 2 daysa ago   Contraception: contraceptive methods: Phexxi  Return in about 1 year (around 4/17/2026) for Annual physical.     Perfecto Lindsay MD  04/17/2025

## 2025-04-30 ENCOUNTER — TELEMEDICINE (OUTPATIENT)
Dept: PSYCHIATRY | Facility: CLINIC | Age: 30
End: 2025-04-30
Payer: COMMERCIAL

## 2025-04-30 DIAGNOSIS — F41.1 GENERALIZED ANXIETY DISORDER: Primary | ICD-10-CM
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
## 2025-05-28 ENCOUNTER — TELEMEDICINE (OUTPATIENT)
Dept: PSYCHIATRY | Facility: CLINIC | Age: 30
End: 2025-05-28
Payer: COMMERCIAL

## 2025-05-28 DIAGNOSIS — F19.10 SUBSTANCE ABUSE: Primary | ICD-10-CM

## 2025-05-28 DIAGNOSIS — F41.1 GENERALIZED ANXIETY DISORDER: ICD-10-CM

## 2025-05-28 NOTE — PROGRESS NOTES
Mode of Visit: Video  Location of patient: -OTHER-: parked private vehicle  Location of provider: +HOME+  You have chosen to receive care through a telehealth visit.  The patient has signed the video visit consent form.  The visit included audio and video interaction. No technical issues occurred during this visit.  Date: May 28, 2025  Time In: 09:33 EDT  Time out: 9:50    This provider is located at [facility], [address] [place] [address], [facility], using a secure [employer] Video Visit through [employer]. Patient is being seen remotely via telehealth at their home address is located in [place]. Patient stated they are in a secure environment for this session. The patient's condition being diagnosed and treated is appropriate for telemedicine. The provider identified herself as well as her credentials. The patient consents to be seen remotely, and when consent is given they understand that the consent allows for patient identifiable information to be sent to a third party as needed. They may refuse to be seen remotely at any time. The electronic data is encrypted and password protected, and the patient has been advised of the potential risks to privacy not withstanding such measures.   PT Identifiers used: Name and .    You have chosen to receive care through a telehealth visit.  Do you consent to use a video/audio connection for your medical care today? Yes    Subjective     Chief Complaint:   Chief Complaint   Patient presents with    Addiction Problem    Anxiety      History of Present Illness  Silvia Bravo is a 30 y.o. female who presents for evaluation of substance use disorder.    She is currently residing with her mother, awaiting the completion of her apartment in [place] by the end of 2025. She lives with her daughter's father, who is experiencing recurrent drug use, which is causing significant distress as she is nearing her 9th month of sobriety. She expresses concern  about her potential return to use due to the stress of managing her family, work, and personal mental health. Additionally, she is facing legal issues in [place] related to her son, which require her to hire another . She is struggling with the decision to either give her partner another chance or ask him to leave. She has been successful in resisting urges for the past 9 months but fears she may not be able to continue. She is considering asking her partner to leave by Friday, as she believes she can manage until then if he remains sober. She is also dealing with his schizophrenia, which he is not consistently medicating for. She plans to attend a meeting today at 12:30 PM, as she feels the need to share and listen. She attempted to attend a meeting 2 weeks ago with her partner, but they had to leave due to his psychosis. She is committed to maintaining her sobriety for the sake of her children and family. She plans to be honest with her mother about the situation and give her partner until Friday to leave. She is considering finding a sponsor at the meetings she attends on Sundays. She has not attended a meeting in 2 weeks and does not currently have a sponsor.    Social History:  - Resides with mother  - Has a daughter and a son  - Facing legal issues in [place] regarding her son    Substance Use:  - Nearing 9 months of sobriety  - Partner is a chronic user of methamphetamine and heroin  - Previously used cocaine and crack  - Has not attended a meeting in 2 weeks  - Does not currently have a sponsor    SOCIAL HISTORY  She reports no alcohol intake. She has been clean from drugs for 9 months.       Current Outpatient Medications   Medication Sig Dispense Refill    cetirizine (zyrTEC) 10 MG tablet Take 1 tablet by mouth Daily. (Patient not taking: Reported on 4/17/2025) 90 tablet 2    hydrocortisone 1 % cream Apply 1 Application topically to the appropriate area as directed 2 (Two) Times a Day. (Patient not  taking: Reported on 4/17/2025) 60 g 1    Lactic Ac-Citric Ac-Pot Bitart (Phexxi) 1.8-1-0.4 % gel Insert 1 applicator into the vagina 1 (One) Time As Needed (contraceptiv) for up to 1 dose. 1 g 4    lisdexamfetamine dimesylate (Vyvanse) 10 MG capsule Take 1 capsule by mouth Daily 30 capsule 0    Prenatal MV-Min-Fe Fum-FA-DHA (Prenatal Multivitamin + DHA) 28-0.8 & 200 MG misc Take 1 tablet by mouth Daily. 30 each 2     No current facility-administered medications for this visit.        MENTAL STATUS EXAM   General Appearance:  Cleanly groomed and dressed  Eye Contact:  Good eye contact  Attitude:  Cooperative and polite  Motor Activity:  Normal gait, posture  Speech:  Normal rate, tone, volume  Language:  Stereotypical  Mood and affect:  Irritable, frustrated and anxious  Thought Process:  Logical  Associations/ Thought Content:  No delusions  Hallucinations:  None  Suicidal Ideations:  Not present  Homicidal Ideation:  Not present  Sensorium:  Alert and clear  Orientation:  Person, place, time and situation  Immediate Recall, Recent, and Remote Memory:  Intact  Attention Span/ Concentration:  Good  Fund of Knowledge:  Appropriate for age and educational level  Intellectual Functioning:  Average range  Insight:  Good  Judgement:  Good  Reliability:  Good  Impulse Control:  Good       Return in 8 days (on 6/5/2025) for Next scheduled follow up, Video visit.      VISIT DIAGNOSIS:    Diagnosis Plan   1. Substance abuse             Assessment & Plan  Problems:  - Substance abuse  - Stress related to living situation and partner's relapse    Content of Therapy:  During the session, the patient discussed her current living situation, including the stress caused by her partner's relapse and the impact it has on her own sobriety. She expressed feelings of being overwhelmed and close to relapsing. The conversation included exploring her responsibilities, reframing thoughts about self-care, and addressing the need to make  difficult decisions regarding her partner. The patient also mentioned her struggle with urges and the importance of attending meetings and finding a sponsor for support.    Clinical Impression:  The patient is experiencing significant stress due to her partner's relapse and the demands of her living situation. Despite being clean for 9 months, she feels close to relapsing and is struggling to manage her responsibilities. Her mental health appears to be strained, and she is in need of additional support to maintain her sobriety. She has shown resilience by attending meetings and using coping strategies, but the current situation is challenging her stability.    Therapeutic Intervention:  - Reframing thoughts about self-care and prioritizing her own recovery  - Encouraging attendance at support meetings  - Discussing the importance of honesty with her mother to avoid the dangers of keeping secrets during recovery  - Exploring the possibility of finding a sponsor for additional support    Plan:  - Attend the scheduled meeting today at 12:30 PM  - Be honest with her mother about the situation  - Give her partner until Friday to leave the house  - Consider finding a sponsor at the Sunday meeting  - Use coping strategies such as spending time with her daughter to stay grounded    Follow-up:  The patient is scheduled for a follow-up visit on 06/05/2025 at 4:30 PM.    Notes & Risk Factors:  - Risk of relapse due to stress and partner's behavior  - Protective factors include attending meetings, considering a sponsor, and support from her mother    This complexity of interaction during this service was effected by the following factor(s) : managing maladaptive communication (related to, e.g., depression, PTSD, high anxiety, high reactivity, repeated questions, or disagreement) among participants complicating delivery of care         This document has been electronically signed by JENN Castellano  May 28, 2025      Patient  or patient representative verbalized consent for the use of Ambient Listening during the visit with  JENN Castellano for chart documentation. 5/28/2025  09:36 EDT    Part of this note may be an electronic transcription/translation of spoken language to printed text using the Dragon Dictation System.

## 2025-06-06 ENCOUNTER — TELEPHONE (OUTPATIENT)
Dept: PSYCHIATRY | Facility: CLINIC | Age: 30
End: 2025-06-06
Payer: COMMERCIAL

## 2025-06-30 ENCOUNTER — TELEPHONE (OUTPATIENT)
Dept: FAMILY MEDICINE CLINIC | Facility: CLINIC | Age: 30
End: 2025-06-30

## 2025-06-30 RX ORDER — ACYCLOVIR 50 MG/G
1 OINTMENT TOPICAL 4 TIMES DAILY
Qty: 15 G | Refills: 2 | Status: SHIPPED | OUTPATIENT
Start: 2025-06-30

## 2025-06-30 NOTE — TELEPHONE ENCOUNTER
"Caller: Silvia Bravo \"Lisa\"    Relationship: Self    Best call back number: [phone]     What medication are you requesting: VALACYCLOVIR CREAM    What are your current symptoms: OUTBREAK    How long have you been experiencing symptoms: PAST 5 DAYS    Have you had these symptoms before:    [x] Yes  [] No    Have you been treated for these symptoms before:   [x] Yes  [] No    If a prescription is needed, what is your preferred pharmacy and phone number: [facility] [id number] [address]  AT [facility] [address] - [phone]  - [phone] FX     Additional notes: PATIENT CALLED TO [address] BILLS ABOUT SENDING IN THIS MEDICATION TO HELP WITH THE OUTBREAK THAT SHE HAS BEEN EXPERIENCING FOR THE PAST 5 DAYS    PLEASE ADVISE    "